# Patient Record
Sex: MALE | Race: BLACK OR AFRICAN AMERICAN | NOT HISPANIC OR LATINO | Employment: FULL TIME | URBAN - METROPOLITAN AREA
[De-identification: names, ages, dates, MRNs, and addresses within clinical notes are randomized per-mention and may not be internally consistent; named-entity substitution may affect disease eponyms.]

---

## 2017-09-06 ENCOUNTER — TRANSCRIBE ORDERS (OUTPATIENT)
Dept: LAB | Facility: HOSPITAL | Age: 57
End: 2017-09-06

## 2017-09-06 ENCOUNTER — LAB (OUTPATIENT)
Dept: LAB | Facility: HOSPITAL | Age: 57
End: 2017-09-06

## 2017-09-06 DIAGNOSIS — N18.30 CHRONIC KIDNEY DISEASE, STAGE III (MODERATE) (HCC): ICD-10-CM

## 2017-09-06 DIAGNOSIS — E55.9 UNSPECIFIED VITAMIN D DEFICIENCY: ICD-10-CM

## 2017-09-06 DIAGNOSIS — E55.9 UNSPECIFIED VITAMIN D DEFICIENCY: Primary | ICD-10-CM

## 2017-09-06 LAB
25(OH)D3 SERPL-MCNC: 25.7 NG/ML
ALBUMIN SERPL-MCNC: 4.2 G/DL (ref 3.2–4.8)
ANION GAP SERPL CALCULATED.3IONS-SCNC: 3 MMOL/L (ref 3–11)
BACTERIA UR QL AUTO: ABNORMAL /HPF
BASOPHILS # BLD AUTO: 0.02 10*3/MM3 (ref 0–0.2)
BASOPHILS NFR BLD AUTO: 0.3 % (ref 0–1)
BILIRUB UR QL STRIP: NEGATIVE
BUN BLD-MCNC: 19 MG/DL (ref 9–23)
BUN/CREAT SERPL: 11.9 (ref 7–25)
CALCIUM SPEC-SCNC: 9.3 MG/DL (ref 8.7–10.4)
CHLORIDE SERPL-SCNC: 108 MMOL/L (ref 99–109)
CLARITY UR: CLEAR
CO2 SERPL-SCNC: 26 MMOL/L (ref 20–31)
COLOR UR: YELLOW
CREAT BLD-MCNC: 1.6 MG/DL (ref 0.6–1.3)
DEPRECATED RDW RBC AUTO: 43.6 FL (ref 37–54)
EOSINOPHIL # BLD AUTO: 0.18 10*3/MM3 (ref 0–0.3)
EOSINOPHIL NFR BLD AUTO: 3.1 % (ref 0–3)
ERYTHROCYTE [DISTWIDTH] IN BLOOD BY AUTOMATED COUNT: 14.2 % (ref 11.3–14.5)
GFR SERPL CREATININE-BSD FRML MDRD: 54 ML/MIN/1.73
GLUCOSE BLD-MCNC: 98 MG/DL (ref 70–100)
GLUCOSE UR STRIP-MCNC: NEGATIVE MG/DL
HCT VFR BLD AUTO: 42.4 % (ref 38.9–50.9)
HGB BLD-MCNC: 13.1 G/DL (ref 13.1–17.5)
HGB UR QL STRIP.AUTO: NEGATIVE
HYALINE CASTS UR QL AUTO: ABNORMAL /LPF
IMM GRANULOCYTES # BLD: 0.01 10*3/MM3 (ref 0–0.03)
IMM GRANULOCYTES NFR BLD: 0.2 % (ref 0–0.6)
KETONES UR QL STRIP: NEGATIVE
LEUKOCYTE ESTERASE UR QL STRIP.AUTO: NEGATIVE
LYMPHOCYTES # BLD AUTO: 2.79 10*3/MM3 (ref 0.6–4.8)
LYMPHOCYTES NFR BLD AUTO: 48 % (ref 24–44)
MCH RBC QN AUTO: 26.4 PG (ref 27–31)
MCHC RBC AUTO-ENTMCNC: 30.9 G/DL (ref 32–36)
MCV RBC AUTO: 85.3 FL (ref 80–99)
MONOCYTES # BLD AUTO: 0.5 10*3/MM3 (ref 0–1)
MONOCYTES NFR BLD AUTO: 8.6 % (ref 0–12)
NEUTROPHILS # BLD AUTO: 2.31 10*3/MM3 (ref 1.5–8.3)
NEUTROPHILS NFR BLD AUTO: 39.8 % (ref 41–71)
NITRITE UR QL STRIP: NEGATIVE
PH UR STRIP.AUTO: 5 [PH] (ref 5–8)
PHOSPHATE SERPL-MCNC: 3.6 MG/DL (ref 2.4–5.1)
PLATELET # BLD AUTO: 194 10*3/MM3 (ref 150–450)
PMV BLD AUTO: 10.8 FL (ref 6–12)
POTASSIUM BLD-SCNC: 4.5 MMOL/L (ref 3.5–5.5)
PROT UR QL STRIP: NEGATIVE
RBC # BLD AUTO: 4.97 10*6/MM3 (ref 4.2–5.76)
RBC # UR: ABNORMAL /HPF
REF LAB TEST METHOD: ABNORMAL
SODIUM BLD-SCNC: 137 MMOL/L (ref 132–146)
SP GR UR STRIP: 1.02 (ref 1–1.03)
SQUAMOUS #/AREA URNS HPF: ABNORMAL /HPF
UROBILINOGEN UR QL STRIP: NORMAL
WBC NRBC COR # BLD: 5.81 10*3/MM3 (ref 3.5–10.8)
WBC UR QL AUTO: ABNORMAL /HPF

## 2017-09-06 PROCEDURE — 36415 COLL VENOUS BLD VENIPUNCTURE: CPT

## 2017-09-06 PROCEDURE — 80069 RENAL FUNCTION PANEL: CPT | Performed by: INTERNAL MEDICINE

## 2017-09-06 PROCEDURE — 81001 URINALYSIS AUTO W/SCOPE: CPT | Performed by: INTERNAL MEDICINE

## 2017-09-06 PROCEDURE — 82306 VITAMIN D 25 HYDROXY: CPT | Performed by: INTERNAL MEDICINE

## 2017-09-06 PROCEDURE — 85025 COMPLETE CBC W/AUTO DIFF WBC: CPT | Performed by: INTERNAL MEDICINE

## 2018-05-07 DIAGNOSIS — Z12.11 SCREENING FOR COLON CANCER: Primary | ICD-10-CM

## 2018-05-14 ENCOUNTER — OUTSIDE FACILITY SERVICE (OUTPATIENT)
Dept: GASTROENTEROLOGY | Facility: CLINIC | Age: 58
End: 2018-05-14

## 2018-05-14 PROCEDURE — G0105 COLORECTAL SCRN; HI RISK IND: HCPCS | Performed by: INTERNAL MEDICINE

## 2020-08-13 ENCOUNTER — APPOINTMENT (OUTPATIENT)
Dept: GENERAL RADIOLOGY | Facility: HOSPITAL | Age: 60
End: 2020-08-13

## 2020-08-13 ENCOUNTER — APPOINTMENT (OUTPATIENT)
Dept: CT IMAGING | Facility: HOSPITAL | Age: 60
End: 2020-08-13

## 2020-08-13 ENCOUNTER — HOSPITAL ENCOUNTER (INPATIENT)
Facility: HOSPITAL | Age: 60
LOS: 3 days | Discharge: HOME | End: 2020-08-16
Attending: EMERGENCY MEDICINE | Admitting: INTERNAL MEDICINE

## 2020-08-13 DIAGNOSIS — A41.9 ACUTE SEPSIS (HCC): Primary | ICD-10-CM

## 2020-08-13 DIAGNOSIS — N41.0 ACUTE PROSTATITIS: ICD-10-CM

## 2020-08-13 DIAGNOSIS — N17.9 ACUTE KIDNEY INJURY (HCC): ICD-10-CM

## 2020-08-13 DIAGNOSIS — E87.20 LACTIC ACIDOSIS: ICD-10-CM

## 2020-08-13 PROBLEM — R74.8 ELEVATED ALKALINE PHOSPHATASE LEVEL: Status: ACTIVE | Noted: 2020-08-13

## 2020-08-13 PROBLEM — E87.6 HYPOKALEMIA: Status: ACTIVE | Noted: 2020-08-13

## 2020-08-13 PROBLEM — I95.9 HYPOTENSION: Status: ACTIVE | Noted: 2020-08-13

## 2020-08-13 PROBLEM — R73.9 HYPERGLYCEMIA: Status: ACTIVE | Noted: 2020-08-13

## 2020-08-13 PROBLEM — N39.0 ACUTE UTI (URINARY TRACT INFECTION): Status: ACTIVE | Noted: 2020-08-13

## 2020-08-13 LAB
ALBUMIN SERPL-MCNC: 4.1 G/DL (ref 3.5–5.2)
ALBUMIN/GLOB SERPL: 1.2 G/DL
ALP SERPL-CCNC: 130 U/L (ref 39–117)
ALT SERPL W P-5'-P-CCNC: 21 U/L (ref 1–41)
ANION GAP SERPL CALCULATED.3IONS-SCNC: 15 MMOL/L (ref 5–15)
AST SERPL-CCNC: 27 U/L (ref 1–40)
BACTERIA UR QL AUTO: ABNORMAL /HPF
BASOPHILS # BLD MANUAL: 0 10*3/MM3 (ref 0–0.2)
BASOPHILS NFR BLD AUTO: 0 % (ref 0–1.5)
BILIRUB SERPL-MCNC: 0.8 MG/DL (ref 0–1.2)
BILIRUB UR QL STRIP: NEGATIVE
BUN SERPL-MCNC: 26 MG/DL (ref 8–23)
BUN/CREAT SERPL: 11.4 (ref 7–25)
CALCIUM SPEC-SCNC: 9.4 MG/DL (ref 8.6–10.5)
CHLORIDE SERPL-SCNC: 102 MMOL/L (ref 98–107)
CLARITY UR: ABNORMAL
CLUMPED PLATELETS: PRESENT
CO2 SERPL-SCNC: 18 MMOL/L (ref 22–29)
COLOR UR: YELLOW
CREAT SERPL-MCNC: 2.29 MG/DL (ref 0.76–1.27)
D-LACTATE SERPL-SCNC: 2.8 MMOL/L (ref 0.5–2)
DEPRECATED RDW RBC AUTO: 41.5 FL (ref 37–54)
DOHLE BODIES: PRESENT
EOSINOPHIL # BLD MANUAL: 0.16 10*3/MM3 (ref 0–0.4)
EOSINOPHIL NFR BLD MANUAL: 2 % (ref 0.3–6.2)
ERYTHROCYTE [DISTWIDTH] IN BLOOD BY AUTOMATED COUNT: 13.7 % (ref 12.3–15.4)
GFR SERPL CREATININE-BSD FRML MDRD: 35 ML/MIN/1.73
GLOBULIN UR ELPH-MCNC: 3.4 GM/DL
GLUCOSE SERPL-MCNC: 168 MG/DL (ref 65–99)
GLUCOSE UR STRIP-MCNC: NEGATIVE MG/DL
HCT VFR BLD AUTO: 42.7 % (ref 37.5–51)
HGB BLD-MCNC: 13.4 G/DL (ref 13–17.7)
HGB UR QL STRIP.AUTO: ABNORMAL
HOLD SPECIMEN: NORMAL
HOLD SPECIMEN: NORMAL
HYALINE CASTS UR QL AUTO: ABNORMAL /LPF
KETONES UR QL STRIP: ABNORMAL
LACTATE HOLD SPECIMEN: NORMAL
LARGE PLATELETS: ABNORMAL
LEUKOCYTE ESTERASE UR QL STRIP.AUTO: ABNORMAL
LYMPHOCYTES # BLD MANUAL: 1.01 10*3/MM3 (ref 0.7–3.1)
LYMPHOCYTES NFR BLD MANUAL: 13 % (ref 19.6–45.3)
LYMPHOCYTES NFR BLD MANUAL: 3 % (ref 5–12)
MCH RBC QN AUTO: 26.1 PG (ref 26.6–33)
MCHC RBC AUTO-ENTMCNC: 31.4 G/DL (ref 31.5–35.7)
MCV RBC AUTO: 83.2 FL (ref 79–97)
MONOCYTES # BLD AUTO: 0.23 10*3/MM3 (ref 0.1–0.9)
NEUTROPHILS # BLD AUTO: 6.39 10*3/MM3 (ref 1.7–7)
NEUTROPHILS NFR BLD MANUAL: 69 % (ref 42.7–76)
NEUTS BAND NFR BLD MANUAL: 13 % (ref 0–5)
NITRITE UR QL STRIP: NEGATIVE
PH UR STRIP.AUTO: <=5 [PH] (ref 5–8)
PLATELET # BLD AUTO: 166 10*3/MM3 (ref 140–450)
PMV BLD AUTO: 10.5 FL (ref 6–12)
POTASSIUM SERPL-SCNC: 3.4 MMOL/L (ref 3.5–5.2)
PROT SERPL-MCNC: 7.5 G/DL (ref 6–8.5)
PROT UR QL STRIP: ABNORMAL
RBC # BLD AUTO: 5.13 10*6/MM3 (ref 4.14–5.8)
RBC # UR: ABNORMAL /HPF
RBC MORPH BLD: NORMAL
REF LAB TEST METHOD: ABNORMAL
SMALL PLATELETS BLD QL SMEAR: ADEQUATE
SODIUM SERPL-SCNC: 135 MMOL/L (ref 136–145)
SP GR UR STRIP: 1.07 (ref 1–1.03)
SQUAMOUS #/AREA URNS HPF: ABNORMAL /HPF
TOXIC GRANULATION: ABNORMAL
UROBILINOGEN UR QL STRIP: ABNORMAL
WBC # BLD AUTO: 7.79 10*3/MM3 (ref 3.4–10.8)
WBC UR QL AUTO: ABNORMAL /HPF
WHOLE BLOOD HOLD SPECIMEN: NORMAL
WHOLE BLOOD HOLD SPECIMEN: NORMAL

## 2020-08-13 PROCEDURE — 87186 SC STD MICRODIL/AGAR DIL: CPT | Performed by: EMERGENCY MEDICINE

## 2020-08-13 PROCEDURE — 99223 1ST HOSP IP/OBS HIGH 75: CPT | Performed by: INTERNAL MEDICINE

## 2020-08-13 PROCEDURE — 99284 EMERGENCY DEPT VISIT MOD MDM: CPT

## 2020-08-13 PROCEDURE — 82570 ASSAY OF URINE CREATININE: CPT | Performed by: NURSE PRACTITIONER

## 2020-08-13 PROCEDURE — 84156 ASSAY OF PROTEIN URINE: CPT | Performed by: NURSE PRACTITIONER

## 2020-08-13 PROCEDURE — 87086 URINE CULTURE/COLONY COUNT: CPT | Performed by: NURSE PRACTITIONER

## 2020-08-13 PROCEDURE — 81001 URINALYSIS AUTO W/SCOPE: CPT | Performed by: EMERGENCY MEDICINE

## 2020-08-13 PROCEDURE — 74177 CT ABD & PELVIS W/CONTRAST: CPT

## 2020-08-13 PROCEDURE — 83605 ASSAY OF LACTIC ACID: CPT | Performed by: EMERGENCY MEDICINE

## 2020-08-13 PROCEDURE — 25010000002 PIPERACILLIN SOD-TAZOBACTAM PER 1 G: Performed by: EMERGENCY MEDICINE

## 2020-08-13 PROCEDURE — 93005 ELECTROCARDIOGRAM TRACING: CPT | Performed by: EMERGENCY MEDICINE

## 2020-08-13 PROCEDURE — 87088 URINE BACTERIA CULTURE: CPT | Performed by: NURSE PRACTITIONER

## 2020-08-13 PROCEDURE — 85025 COMPLETE CBC W/AUTO DIFF WBC: CPT | Performed by: EMERGENCY MEDICINE

## 2020-08-13 PROCEDURE — 25010000002 IOPAMIDOL 61 % SOLUTION: Performed by: EMERGENCY MEDICINE

## 2020-08-13 PROCEDURE — 83935 ASSAY OF URINE OSMOLALITY: CPT | Performed by: NURSE PRACTITIONER

## 2020-08-13 PROCEDURE — 71045 X-RAY EXAM CHEST 1 VIEW: CPT

## 2020-08-13 PROCEDURE — 87205 SMEAR GRAM STAIN: CPT | Performed by: NURSE PRACTITIONER

## 2020-08-13 PROCEDURE — 87150 DNA/RNA AMPLIFIED PROBE: CPT | Performed by: EMERGENCY MEDICINE

## 2020-08-13 PROCEDURE — 84300 ASSAY OF URINE SODIUM: CPT | Performed by: NURSE PRACTITIONER

## 2020-08-13 PROCEDURE — 80053 COMPREHEN METABOLIC PANEL: CPT | Performed by: EMERGENCY MEDICINE

## 2020-08-13 PROCEDURE — 85007 BL SMEAR W/DIFF WBC COUNT: CPT | Performed by: EMERGENCY MEDICINE

## 2020-08-13 PROCEDURE — 87040 BLOOD CULTURE FOR BACTERIA: CPT | Performed by: EMERGENCY MEDICINE

## 2020-08-13 PROCEDURE — 87186 SC STD MICRODIL/AGAR DIL: CPT | Performed by: NURSE PRACTITIONER

## 2020-08-13 RX ORDER — SODIUM CHLORIDE 0.9 % (FLUSH) 0.9 %
10 SYRINGE (ML) INJECTION AS NEEDED
Status: DISCONTINUED | OUTPATIENT
Start: 2020-08-13 | End: 2020-08-16 | Stop reason: HOSPADM

## 2020-08-13 RX ADMIN — SODIUM CHLORIDE, POTASSIUM CHLORIDE, SODIUM LACTATE AND CALCIUM CHLORIDE 1000 ML: 600; 310; 30; 20 INJECTION, SOLUTION INTRAVENOUS at 22:20

## 2020-08-13 RX ADMIN — SODIUM CHLORIDE, POTASSIUM CHLORIDE, SODIUM LACTATE AND CALCIUM CHLORIDE 500 ML: 600; 310; 30; 20 INJECTION, SOLUTION INTRAVENOUS at 20:32

## 2020-08-13 RX ADMIN — SODIUM CHLORIDE, POTASSIUM CHLORIDE, SODIUM LACTATE AND CALCIUM CHLORIDE 1000 ML: 600; 310; 30; 20 INJECTION, SOLUTION INTRAVENOUS at 23:43

## 2020-08-13 RX ADMIN — TAZOBACTAM SODIUM AND PIPERACILLIN SODIUM 4.5 G: 500; 4 INJECTION, SOLUTION INTRAVENOUS at 22:20

## 2020-08-13 RX ADMIN — IOPAMIDOL 95 ML: 612 INJECTION, SOLUTION INTRAVENOUS at 20:14

## 2020-08-14 PROBLEM — N18.30 ACUTE RENAL FAILURE SUPERIMPOSED ON STAGE 3 CHRONIC KIDNEY DISEASE (HCC): Status: ACTIVE | Noted: 2020-08-13

## 2020-08-14 PROBLEM — N41.9 PROSTATITIS: Status: ACTIVE | Noted: 2020-08-14

## 2020-08-14 PROBLEM — R78.81 GRAM-NEGATIVE BACTEREMIA: Status: ACTIVE | Noted: 2020-08-14

## 2020-08-14 LAB
ANION GAP SERPL CALCULATED.3IONS-SCNC: 10 MMOL/L (ref 5–15)
BACTERIA BLD CULT: ABNORMAL
BASOPHILS # BLD MANUAL: 0 10*3/MM3 (ref 0–0.2)
BASOPHILS NFR BLD AUTO: 0 % (ref 0–1.5)
BUN SERPL-MCNC: 26 MG/DL (ref 8–23)
BUN/CREAT SERPL: 11.6 (ref 7–25)
CALCIUM SPEC-SCNC: 8.7 MG/DL (ref 8.6–10.5)
CHLORIDE SERPL-SCNC: 104 MMOL/L (ref 98–107)
CO2 SERPL-SCNC: 22 MMOL/L (ref 22–29)
CREAT SERPL-MCNC: 2.25 MG/DL (ref 0.76–1.27)
CREAT UR-MCNC: 265 MG/DL
D-LACTATE SERPL-SCNC: 2.3 MMOL/L (ref 0.5–2)
DEPRECATED RDW RBC AUTO: 43.1 FL (ref 37–54)
EOSINOPHIL # BLD MANUAL: 0 10*3/MM3 (ref 0–0.4)
EOSINOPHIL NFR BLD MANUAL: 0 % (ref 0.3–6.2)
EOSINOPHIL SPEC QL MICRO: 0 % EOS/100 CELLS (ref 0–0)
ERYTHROCYTE [DISTWIDTH] IN BLOOD BY AUTOMATED COUNT: 13.8 % (ref 12.3–15.4)
GFR SERPL CREATININE-BSD FRML MDRD: 36 ML/MIN/1.73
GLUCOSE SERPL-MCNC: 137 MG/DL (ref 65–99)
HBA1C MFR BLD: 6.1 % (ref 4.8–5.6)
HCT VFR BLD AUTO: 37 % (ref 37.5–51)
HGB BLD-MCNC: 11.3 G/DL (ref 13–17.7)
LYMPHOCYTES # BLD MANUAL: 1.27 10*3/MM3 (ref 0.7–3.1)
LYMPHOCYTES NFR BLD MANUAL: 10 % (ref 19.6–45.3)
LYMPHOCYTES NFR BLD MANUAL: 10 % (ref 5–12)
MCH RBC QN AUTO: 26 PG (ref 26.6–33)
MCHC RBC AUTO-ENTMCNC: 30.5 G/DL (ref 31.5–35.7)
MCV RBC AUTO: 85.1 FL (ref 79–97)
MONOCYTES # BLD AUTO: 1.27 10*3/MM3 (ref 0.1–0.9)
NEUTROPHILS # BLD AUTO: 10.18 10*3/MM3 (ref 1.7–7)
NEUTROPHILS NFR BLD MANUAL: 69 % (ref 42.7–76)
NEUTS BAND NFR BLD MANUAL: 11 % (ref 0–5)
OSMOLALITY UR: 559 MOSM/KG (ref 300–1100)
PLAT MORPH BLD: NORMAL
PLATELET # BLD AUTO: 148 10*3/MM3 (ref 140–450)
PMV BLD AUTO: 10.1 FL (ref 6–12)
POTASSIUM SERPL-SCNC: 4 MMOL/L (ref 3.5–5.2)
PROT UR-MCNC: 49.7 MG/DL
PSA SERPL-MCNC: 20.5 NG/ML (ref 0–4)
RBC # BLD AUTO: 4.35 10*6/MM3 (ref 4.14–5.8)
RBC MORPH BLD: NORMAL
SODIUM SERPL-SCNC: 136 MMOL/L (ref 136–145)
SODIUM UR-SCNC: 20 MMOL/L
WBC # BLD AUTO: 12.73 10*3/MM3 (ref 3.4–10.8)
WBC MORPH BLD: NORMAL

## 2020-08-14 PROCEDURE — 85007 BL SMEAR W/DIFF WBC COUNT: CPT | Performed by: NURSE PRACTITIONER

## 2020-08-14 PROCEDURE — 99233 SBSQ HOSP IP/OBS HIGH 50: CPT | Performed by: INTERNAL MEDICINE

## 2020-08-14 PROCEDURE — 84153 ASSAY OF PSA TOTAL: CPT | Performed by: INTERNAL MEDICINE

## 2020-08-14 PROCEDURE — 83605 ASSAY OF LACTIC ACID: CPT | Performed by: EMERGENCY MEDICINE

## 2020-08-14 PROCEDURE — 25010000002 CEFTRIAXONE PER 250 MG: Performed by: INTERNAL MEDICINE

## 2020-08-14 PROCEDURE — 87491 CHLMYD TRACH DNA AMP PROBE: CPT | Performed by: INTERNAL MEDICINE

## 2020-08-14 PROCEDURE — 25010000002 HEPARIN (PORCINE) PER 1000 UNITS: Performed by: NURSE PRACTITIONER

## 2020-08-14 PROCEDURE — 80048 BASIC METABOLIC PNL TOTAL CA: CPT | Performed by: NURSE PRACTITIONER

## 2020-08-14 PROCEDURE — 87591 N.GONORRHOEAE DNA AMP PROB: CPT | Performed by: INTERNAL MEDICINE

## 2020-08-14 PROCEDURE — 25010000002 PIPERACILLIN SOD-TAZOBACTAM PER 1 G: Performed by: NURSE PRACTITIONER

## 2020-08-14 PROCEDURE — 83036 HEMOGLOBIN GLYCOSYLATED A1C: CPT | Performed by: NURSE PRACTITIONER

## 2020-08-14 PROCEDURE — 85025 COMPLETE CBC W/AUTO DIFF WBC: CPT | Performed by: NURSE PRACTITIONER

## 2020-08-14 RX ORDER — ACETAMINOPHEN 325 MG/1
650 TABLET ORAL EVERY 4 HOURS PRN
Status: DISCONTINUED | OUTPATIENT
Start: 2020-08-14 | End: 2020-08-16 | Stop reason: HOSPADM

## 2020-08-14 RX ORDER — SODIUM CHLORIDE 9 MG/ML
100 INJECTION, SOLUTION INTRAVENOUS CONTINUOUS
Status: DISCONTINUED | OUTPATIENT
Start: 2020-08-14 | End: 2020-08-15

## 2020-08-14 RX ORDER — SODIUM CHLORIDE 0.9 % (FLUSH) 0.9 %
10 SYRINGE (ML) INJECTION EVERY 12 HOURS SCHEDULED
Status: DISCONTINUED | OUTPATIENT
Start: 2020-08-14 | End: 2020-08-16 | Stop reason: HOSPADM

## 2020-08-14 RX ORDER — SODIUM CHLORIDE 0.9 % (FLUSH) 0.9 %
10 SYRINGE (ML) INJECTION AS NEEDED
Status: DISCONTINUED | OUTPATIENT
Start: 2020-08-14 | End: 2020-08-16 | Stop reason: HOSPADM

## 2020-08-14 RX ORDER — HEPARIN SODIUM 5000 [USP'U]/ML
5000 INJECTION, SOLUTION INTRAVENOUS; SUBCUTANEOUS EVERY 8 HOURS SCHEDULED
Status: DISCONTINUED | OUTPATIENT
Start: 2020-08-14 | End: 2020-08-16 | Stop reason: HOSPADM

## 2020-08-14 RX ADMIN — HEPARIN SODIUM 5000 UNITS: 5000 INJECTION INTRAVENOUS; SUBCUTANEOUS at 21:46

## 2020-08-14 RX ADMIN — SODIUM CHLORIDE 125 ML/HR: 9 INJECTION, SOLUTION INTRAVENOUS at 02:06

## 2020-08-14 RX ADMIN — CEFTRIAXONE SODIUM 1 G: 1 INJECTION, POWDER, FOR SOLUTION INTRAMUSCULAR; INTRAVENOUS at 09:04

## 2020-08-14 RX ADMIN — TAZOBACTAM SODIUM AND PIPERACILLIN SODIUM 3.38 G: 375; 3 INJECTION, SOLUTION INTRAVENOUS at 03:37

## 2020-08-14 RX ADMIN — SODIUM CHLORIDE 100 ML/HR: 9 INJECTION, SOLUTION INTRAVENOUS at 21:46

## 2020-08-14 RX ADMIN — ACETAMINOPHEN 650 MG: 325 TABLET, FILM COATED ORAL at 16:31

## 2020-08-14 RX ADMIN — ACETAMINOPHEN 650 MG: 325 TABLET, FILM COATED ORAL at 23:46

## 2020-08-14 RX ADMIN — SODIUM CHLORIDE, PRESERVATIVE FREE 10 ML: 5 INJECTION INTRAVENOUS at 21:50

## 2020-08-14 RX ADMIN — HEPARIN SODIUM 5000 UNITS: 5000 INJECTION INTRAVENOUS; SUBCUTANEOUS at 13:35

## 2020-08-14 RX ADMIN — HEPARIN SODIUM 5000 UNITS: 5000 INJECTION INTRAVENOUS; SUBCUTANEOUS at 06:33

## 2020-08-14 RX ADMIN — SODIUM CHLORIDE, PRESERVATIVE FREE 10 ML: 5 INJECTION INTRAVENOUS at 01:57

## 2020-08-14 NOTE — ED PROVIDER NOTES
EMERGENCY DEPARTMENT ENCOUNTER      Pt Name: Danilo Heath  MRN: [id number]  YOB: 1960  Date of evaluation: 8/13/2020  Provider: Gregorio Quezada MD    CHIEF COMPLAINT       Chief Complaint   Patient presents with   • Dizziness   • Dysuria         HISTORY OF PRESENT ILLNESS  (Location/Symptom, Timing/Onset, Context/Setting, Quality, Duration, Modifying Factors, Severity.)   Danilo Heath is a 59 y.o. male who presents to the emergency department with dysuria, frequency, urgency, body aches, and chills for the past several days.  Patient states that he has a history of prostatitis multiple times in the past. He denies any respiratory symptoms including cough, SOB, CP, or other upper respiratory symptoms. No overt abdominal pain, vomiting, or diarrhea. Describes pain as burning and moderate in severity. Has been taking tylenol at home with some relief.      Nursing notes were reviewed.    REVIEW OF SYSTEMS    (2-9 systems for level 4, 10 or more for level 5)   ROS:  General:  No fevers, no chills, no weakness  Cardiovascular:  No chest pain, no palpitations  Respiratory:  No shortness of breath, no cough, no wheezing  Gastrointestinal:  No pain, no nausea, no vomiting, no diarrhea  Musculoskeletal:  No muscle pain, no joint pain  Skin:  No rash, no easy bruising  Neurologic:  No speech problems, no headache, no extremity numbness, no extremity tingling, no extremity weakness  Psychiatric:  No anxiety  Genitourinary:  + dysuria    Except as noted above the remainder of the review of systems was reviewed and negative.       PAST MEDICAL HISTORY     Past Medical History:   Diagnosis Date   • Bowel obstruction (CMS/HCC)    • Prostatitis          SURGICAL HISTORY       Past Surgical History:   Procedure Laterality Date   • ABDOMINAL SURGERY      intestinal obstruction    • APPENDECTOMY           CURRENT MEDICATIONS       Current Facility-Administered Medications:   •  sodium chloride 0.9 % flush 10 mL, 10  mL, Intravenous, PRN, Gregorio Quezada MD    Current Outpatient Medications:   •  Sod Picosulfate-Mag Ox-Cit Acd 10-3.5-12 MG-GM -GM/160ML solution, Take 1 kit by mouth Take As Directed. Follow instructions mailed to your home. If you did not receive instructions call office [phone]., Disp: 2 bottle, Rfl: 0    ALLERGIES     Patient has no known allergies.    FAMILY HISTORY       Family History   Problem Relation Age of Onset   • Cancer Mother    • Heart disease Father    • Stroke Father    • Diabetes Father           SOCIAL HISTORY       Social History     Socioeconomic History   • Marital status:      Spouse name: Not on file   • Number of children: Not on file   • Years of education: Not on file   • Highest education level: Not on file   Tobacco Use   • Smoking status: Never Smoker   Substance and Sexual Activity   • Alcohol use: Yes     Comment: SOCIALLY- WEEKLY    • Drug use: Never         PHYSICAL EXAM    (up to 7 for level 4, 8 or more for level 5)     Vitals:    08/13/20 2230 08/13/20 2245 08/13/20 2300 08/14/20 0000   BP: 91/65  93/58 99/69   BP Location:       Patient Position:       Pulse: 79 80 76    Resp:       Temp:       TempSrc:       SpO2: 95% 94% 94%    Weight:       Height:           Physical Exam  General: Awake, alert, no acute distress.  HEENT: Conjunctiva normal.  Neck: Trachea midline.  Cardiac: tachycardic, regular rhythm, no murmurs, rubs, or gallops  Lungs: Lungs are clear to auscultation, there is no wheezing, rhonchi, or rales. There is no use of accessory muscles.  Chest wall: There is no tenderness to palpation over the chest wall or over ribs  Abdomen: Abdomen is soft, nontender, nondistended. There is no firm or pulsatile masses, no rebound rigidity or guarding.   Musculoskeletal: No deformity.  Neuro: Alert and oriented x 4.  Dermatology: Skin is warm and dry  Psych: Mentation is grossly normal, cognition is grossly normal. Affect is appropriate.      DIAGNOSTIC RESULTS      EKG: All EKG's are interpreted by the Emergency Department Physician who either signs or Co-signs this chart in the absence of a cardiologist.    ST rate 105, no acute ST/T change, normal intervals, no ectopy    RADIOLOGY:   Non-plain film images such as CT, Ultrasound and MRI are read by the radiologist. Plain radiographic images are visualized and preliminarily interpreted by the emergency physician with the below findings:      [x] Radiologist's Report Reviewed:  CT Abdomen Pelvis With Contrast   Final Result   Prostate gland appears be slightly lower in density than usual. It is slightly prominent measuring 5.1 cm maximum dimension.      No abscess is visible.      Otherwise study is normal               Signer Name: Roman Franco MD    Signed: 8/13/2020 8:31 PM    Workstation Name: [facility]     Radiology [facility]      XR Chest 1 View   Final Result   No acute cardiopulmonary findings.      Signer Name: WHITNEY Quezada MD    Signed: 8/13/2020 8:12 PM    Workstation Name: [facility]            LABS:    I have reviewed and interpreted all of the currently available lab results from this visit (if applicable):  Results for orders placed or performed during the hospital encounter of 08/13/20   Comprehensive Metabolic Panel   Result Value Ref Range    Glucose 168 (H) 65 - 99 mg/dL    BUN 26 (H) 8 - 23 mg/dL    Creatinine 2.29 (H) 0.76 - 1.27 mg/dL    Sodium 135 (L) 136 - 145 mmol/L    Potassium 3.4 (L) 3.5 - 5.2 mmol/L    Chloride 102 98 - 107 mmol/L    CO2 18.0 (L) 22.0 - 29.0 mmol/L    Calcium 9.4 8.6 - 10.5 mg/dL    Total Protein 7.5 6.0 - 8.5 g/dL    Albumin 4.10 3.50 - 5.20 g/dL    ALT (SGPT) 21 1 - 41 U/L    AST (SGOT) 27 1 - 40 U/L    Alkaline Phosphatase 130 (H) 39 - 117 U/L    Total Bilirubin 0.8 0.0 - 1.2 mg/dL    eGFR  African Amer 35 (L) >60 mL/min/1.73    Globulin 3.4 gm/dL    A/G Ratio 1.2 g/dL    BUN/Creatinine Ratio 11.4 7.0 - 25.0     Anion Gap 15.0 5.0 - 15.0 mmol/L   Lactic Acid, Plasma   Result Value Ref Range    Lactate 2.8 (C) 0.5 - 2.0 mmol/L   CBC Auto Differential   Result Value Ref Range    WBC 7.79 3.40 - 10.80 10*3/mm3    RBC 5.13 4.14 - 5.80 10*6/mm3    Hemoglobin 13.4 13.0 - 17.7 g/dL    Hematocrit 42.7 37.5 - 51.0 %    MCV 83.2 79.0 - 97.0 fL    MCH 26.1 (L) 26.6 - 33.0 pg    MCHC 31.4 (L) 31.5 - 35.7 g/dL    RDW 13.7 12.3 - 15.4 %    RDW-SD 41.5 37.0 - 54.0 fl    MPV 10.5 6.0 - 12.0 fL    Platelets 166 140 - 450 10*3/mm3   Lactic Acid, Reflex Timer (This will reflex a repeat order 3-3:15 hours after ordered.)   Result Value Ref Range    Hold Tube Hold for add-ons.    Manual Differential   Result Value Ref Range    Neutrophil % 69.0 42.7 - 76.0 %    Lymphocyte % 13.0 (L) 19.6 - 45.3 %    Monocyte % 3.0 (L) 5.0 - 12.0 %    Eosinophil % 2.0 0.3 - 6.2 %    Basophil % 0.0 0.0 - 1.5 %    Bands %  13.0 (H) 0.0 - 5.0 %    Neutrophils Absolute 6.39 1.70 - 7.00 10*3/mm3    Lymphocytes Absolute 1.01 0.70 - 3.10 10*3/mm3    Monocytes Absolute 0.23 0.10 - 0.90 10*3/mm3    Eosinophils Absolute 0.16 0.00 - 0.40 10*3/mm3    Basophils Absolute 0.00 0.00 - 0.20 10*3/mm3    RBC Morphology Normal Normal    Dohle Bodies Present None Seen    Toxic Granulation Slight/1+ None Seen    Platelet Estimate Adequate Normal    Clumped Platelets Present None Seen    Large Platelets Slight/1+ None Seen   Urinalysis With Microscopic If Indicated (No Culture) - Urine, Clean Catch   Result Value Ref Range    Color, UA Yellow Yellow, Straw    Appearance, UA Cloudy (A) Clear    pH, UA <=5.0 5.0 - 8.0    Specific Gravity, UA 1.073 (H) 1.001 - 1.030    Glucose, UA Negative Negative    Ketones, UA Trace (A) Negative    Bilirubin, UA Negative Negative    Blood, UA Trace (A) Negative    Protein, UA 30 mg/dL (1+) (A) Negative    Leuk Esterase, UA Moderate (2+) (A) Negative    Nitrite, UA Negative Negative    Urobilinogen, UA 1.0 E.U./dL 0.2 - 1.0 E.U./dL   Urinalysis,  Microscopic Only - Urine, Clean Catch   Result Value Ref Range    RBC, UA 13-20 (A) None Seen, 0-2 /HPF    WBC, UA Too Numerous to Count (A) None Seen, 0-2 /HPF    Bacteria, UA 3+ (A) None Seen, Trace /HPF    Squamous Epithelial Cells, UA None Seen None Seen, 0-2 /HPF    Hyaline Casts, UA 0-6 0 - 6 /LPF    Methodology Automated Microscopy    Light Blue Top   Result Value Ref Range    Extra Tube hold for add-on    Green Top (Gel)   Result Value Ref Range    Extra Tube Hold for add-ons.    Lavender Top   Result Value Ref Range    Extra Tube hold for add-on    Gold Top - SST   Result Value Ref Range    Extra Tube Hold for add-ons.         All other labs were within normal range or not returned as of this dictation.      EMERGENCY DEPARTMENT COURSE and DIFFERENTIAL DIAGNOSIS/MDM:   Vitals:    Vitals:    08/13/20 2230 08/13/20 2245 08/13/20 2300 08/14/20 0000   BP: 91/65  93/58 99/69   BP Location:       Patient Position:       Pulse: 79 80 76    Resp:       Temp:       TempSrc:       SpO2: 95% 94% 94%    Weight:       Height:           ED Course as of Aug 14 0019   Thu Aug 13, 2020   2010 Chest x-ray reviewed and no evidence of emergent process    [NS]   2137 Awaiting cath urine    [NS]      ED Course User Index  [NS] Gregorio Quezada MD       Patient w/ acute sepsis 2/2 pyelo vs likely prostatitis. CT neg for abscess or other acute/surgical abdominal/pelvic process. No respiratory sx to suggest covid or PNA and CXR is negative. Pt with borderline HOTN during ED stay and req IV fluids. Also given IV zosyn for sepsis.       MEDICATIONS ADMINISTERED IN ED:  Medications   sodium chloride 0.9 % flush 10 mL (has no administration in time range)   lactated ringers bolus 500 mL (0 mL Intravenous Stopped 8/13/20 2138)   iopamidol (ISOVUE-300) 61 % injection 100 mL (95 mL Intravenous Given 8/13/20 2014)   lactated ringers bolus 1,000 mL (0 mL Intravenous Stopped 8/13/20 2343)   piperacillin-tazobactam (ZOSYN) 4.5 g in  iso-osmotic dextrose 100 mL IVPB (premix) (0 g Intravenous Stopped 8/13/20 4073)   lactated ringers bolus 1,000 mL (1,000 mL Intravenous New Bag 8/13/20 8814)         CRITICAL CARE TIME    Approximately 35 minutes of discontinuous critical care time was provided to this patient by myself absent of any time spent performing procedures.  Patient presents critically ill with sepsis 2/2 pyelonephritis vs prostatitis with hypotension/lactic acidosis and potentiential multi-system compromise requiring the following interventions: IV fluids, broad spectrum abx, frequent reassessment, interpretation of labs/imaging, coordination of admission with the following response: hemodynamic stabilization.  Patient at high risk of deterioration and possibly death without these interventions.        FINAL IMPRESSION      1. Acute sepsis (CMS/HCC)    2. Acute prostatitis    3. Acute kidney injury (CMS/HCC)    4. Lactic acidosis          DISPOSITION/PLAN     ED Disposition     ED Disposition Condition Comment    Decision to Admit  Level of Care: Telemetry [5]   Diagnosis: Prostatitis [092781]   Admitting Physician: KVNG BEAUCHAMP [[id number]]   Attending Physician: KVNG BEAUCHAMP [[id number]]   Certification: I Certify That Inpatient Hospital Services Are Medically Necessary For Greater Than 2 Midnights                Comment: Please note this report has been produced using speech recognition software.      Gregorio Quezada MD  Attending Emergency Physician               Gregorio Quezada MD  08/14/20 0019

## 2020-08-14 NOTE — PROGRESS NOTES
Continued Stay Note  [facility]     Patient Name: Danilo Heath  MRN: [id number]  Today's Date: 8/14/2020    Admit Date: 8/13/2020    Discharge Plan     Row Name 08/14/20 1426       Plan    Plan Comments  CM received call from Suzette  at [facility] office. Pt will need outpatient antibiotic infusions at time of discharge.  If pt is discharged on Saturday 08/15 he has  an appointment arranged for antibiotic infusion at [facility] office on Sunday 08/16/2020 at 0830am. Pt also has a follow up appointment arranged with Dr. Hobson and will also receive antibiotic infusions on Monday 08/17/2020 @ 0845am.  CM updated primary MARILEE Blake of discharge plans. CM will continue to follow.         Discharge Codes    No documentation.             María Guthrie RN

## 2020-08-14 NOTE — PLAN OF CARE
Problem: Patient Care Overview  Goal: Plan of Care Review  Outcome: Ongoing (interventions implemented as appropriate)  Flowsheets  Taken 8/14/2020 0551  Progress: no change  Outcome Summary: Patient arrived on the unit with signs and symptoms related to sepsis and an acute kidney injury. Patient has had a decent shift, sleeping on and off since arriving on the unit. VSS, and patient has been alert and oriented times four. No complaints of pain tonight. Nursing staff will continue to monitor and assess the patient.  Taken 8/14/2020 0200  Plan of Care Reviewed With: patient  Goal: Individualization and Mutuality  Outcome: Ongoing (interventions implemented as appropriate)  Flowsheets (Taken 8/14/2020 0551)  Patient Specific Goals (Include Timeframe): Monitor and assess the patient q2hrs and prn     Problem: Sepsis/Septic Shock (Adult)  Goal: Signs and Symptoms of Listed Potential Problems Will be Absent, Minimized or Managed (Sepsis/Septic Shock)  Outcome: Ongoing (interventions implemented as appropriate)  Flowsheets (Taken 8/14/2020 0551)  Problems Assessed (Sepsis): all  Problems Present (Sepsis): none

## 2020-08-14 NOTE — PLAN OF CARE
Problem: Patient Care Overview  Goal: Plan of Care Review  Flowsheets  Taken 8/14/2020 1526  Progress: improving  Outcome Summary: VSS, room air. Pt reports feeling improved but states he still feels like he has some chills. Spiked a fever of 103 this afternoon, managed with tylenol. Now down to 98.9. Will continue to monitor.  Taken 8/14/2020 0800  Plan of Care Reviewed With: patient

## 2020-08-14 NOTE — H&P
[facility] Medicine Services  HISTORY AND PHYSICAL    Patient Name: Danilo Heath  : 1960  MRN: [id number]  Primary Care Physician: ROSS Cole MD  Date of admission: 2020      Subjective   Subjective     Chief Complaint:  Dysuria, chills, frequency, dizziness     HPI:  Danilo Heath is a 59 y.o. male with PMH significant for bowel obstruction and prostatitis who presents to the ED with complaint of chills, dysuria, myalgias, and urinary frequency.   He states that 3 days ago he developed chills, and myalgias with dysuria, and urinary frequency. He reports that he has also been having dizziness and generally feels unwell. He denies cough, chest pain, SOB, loss of sense of taste or smell or known COVID exposure. He reports having a COVID-19 test 3 days ago that he reports was negative.   Upon arrival to the ED, he is hypotensive and mildly tachycardic. He is found to have KAMINI and lactic acidosis. UA is concerning for UTI. CXR is negative for acute findings. CT abdomen and pelvis shows mildly hypodense prostate without abscess.   He will be admitted to Hospital Medicine for further evaluation.     Review of Systems   Constitutional: Positive for activity change, chills and fatigue. Negative for appetite change, diaphoresis and fever.   HENT: Negative.    Eyes: Negative for visual disturbance.   Respiratory: Negative for cough, chest tightness, shortness of breath and wheezing.    Cardiovascular: Negative for chest pain, palpitations and leg swelling.   Gastrointestinal: Negative for abdominal distention, abdominal pain, constipation, diarrhea, nausea and vomiting.   Genitourinary: Positive for decreased urine volume, difficulty urinating, dysuria, frequency and urgency.   Musculoskeletal: Positive for myalgias. Negative for arthralgias and gait problem.   Skin: Negative for color change, pallor and rash.   Neurological: Positive for dizziness. Negative for speech  difficulty, weakness, light-headedness and headaches.   Psychiatric/Behavioral: Negative for confusion. The patient is not nervous/anxious.         COVID-19 RISK SCREEN     1. Has the patient had close contact without PPE with a lab confirmed COVID-19 (+) person or a person under investigation (PUI) for COVID-19 infection?  -- No     2. Has the patient had respiratory symptoms, worsened/new cough and/or SOA, unexplained fever, or sudden loss of smell and/or taste in the past 7 days? --  No    3. Does the patient have baseline higher exposure risk such as working in healthcare field, currently residing in healthcare facility, or ongoing hemodialysis?  --  No       All other systems reviewed and are negative.     Personal History     Past Medical History:   Diagnosis Date   • Bowel obstruction (CMS/HCC)    • Prostatitis        Past Surgical History:   Procedure Laterality Date   • ABDOMINAL SURGERY      intestinal obstruction    • APPENDECTOMY         Family History: family history includes Cancer in his mother; Diabetes in his father; Heart disease in his father; Stroke in his father. Otherwise pertinent FHx was reviewed and unremarkable.     Social History:  reports that he has never smoked. He does not have any smokeless tobacco history on file. He reports that he drinks alcohol. He reports that he does not use drugs.  Social History     Social History Narrative   • Not on file       Medications:  Available home medication information reviewed.    (Not in a hospital admission)    No Known Allergies    Objective   Objective     Vital Signs:   Temp:  [97.8 °F (36.6 °C)] 97.8 °F (36.6 °C)  Heart Rate:  [] 76  Resp:  [22] 22  BP: (90-98)/(58-67) 93/58        Physical Exam   Constitutional: He is oriented to person, place, and time. He appears well-developed and well-nourished. No distress.   HENT:   Head: Normocephalic and atraumatic.   Eyes: Pupils are equal, round, and reactive to light.   Neck: Normal range of  motion. Neck supple. No JVD present.   Cardiovascular: Normal rate, regular rhythm, normal heart sounds and intact distal pulses. Exam reveals no gallop and no friction rub.   No murmur heard.  Pulmonary/Chest: Effort normal and breath sounds normal. No respiratory distress. He has no wheezes. He has no rales.   Abdominal: Soft. Bowel sounds are normal. He exhibits no distension. There is no tenderness. There is no guarding.   Musculoskeletal: Normal range of motion. He exhibits no edema or tenderness.   Neurological: He is alert and oriented to person, place, and time.   Skin: Skin is warm and dry. Capillary refill takes less than 2 seconds. No rash noted. No erythema.   Psychiatric: He has a normal mood and affect. His behavior is normal. Thought content normal.   Vitals reviewed.         Results Reviewed:  I have personally reviewed current lab and radiology data.    Results from last 7 days   Lab Units 08/13/20 1919   WBC 10*3/mm3 7.79   HEMOGLOBIN g/dL 13.4   HEMATOCRIT % 42.7   PLATELETS 10*3/mm3 166     Results from last 7 days   Lab Units 08/13/20 1919   SODIUM mmol/L 135*   POTASSIUM mmol/L 3.4*   CHLORIDE mmol/L 102   CO2 mmol/L 18.0*   BUN mg/dL 26*   CREATININE mg/dL 2.29*   GLUCOSE mg/dL 168*   CALCIUM mg/dL 9.4   ALT (SGPT) U/L 21   AST (SGOT) U/L 27   LACTATE mmol/L 2.8*     Estimated Creatinine Clearance: 43.8 mL/min (A) (by C-G formula based on SCr of 2.29 mg/dL (H)).  Brief Urine Lab Results  (Last result in the past 365 days)      Color   Clarity   Blood   Leuk Est   Nitrite   Protein   CREAT   Urine HCG        08/13/20 2145 Yellow Cloudy Trace Moderate (2+) Negative 30 mg/dL (1+)             Imaging Results (Last 24 Hours)     Procedure Component Value Units Date/Time    CT Abdomen Pelvis With Contrast [[id number]] Collected:  08/13/20 2031     Updated:  08/13/20 2033    Narrative:       CT Abdomen Pelvis W    INDICATION:   Lower abdomen pain today. Possible prostatitis with abscess. Frequent  urination    TECHNIQUE:   CT of the abdomen and pelvis with IV contrast. Coronal and sagittal reconstructions were obtained.  Radiation dose reduction techniques included automated exposure control or exposure modulation based on body size. Count of known CT and cardiac nuc med  studies performed in previous 12 months: 0.     COMPARISON:   None available.    FINDINGS:  Abdomen: Lung bases are clear. Liver, gallbladder, spleen, pancreas, adrenal glands and kidneys are normal in appearance. The aorta is normal in size. There is no adenopathy. The bowel is unremarkable.    Pelvis: Bladder is normal. Prostate gland is mild prominence. It measures 5.2 cm maximum dimension. Most of the prostate tissue is slightly hypodense. No abscess is visible.      Impression:       Prostate gland appears be slightly lower in density than usual. It is slightly prominent measuring 5.1 cm maximum dimension.    No abscess is visible.    Otherwise study is normal          Signer Name: Roman Franco MD   Signed: 8/13/2020 8:31 PM   Workstation Name: [facility]    [facility]    XR Chest 1 View [[id number]] Collected:  08/13/20 2012     Updated:  08/13/20 2020    Narrative:       CR Chest 1 Vw    INDICATION:   Sepsis with painful urination. Chills and sweating off and on for 2 days.     COMPARISON:    None available.    FINDINGS:  Single portable AP view(s) of the chest.  The heart and mediastinal contours are normal. The lungs are clear. No pneumothorax or pleural effusion.      Impression:       No acute cardiopulmonary findings.    Signer Name: WHITNEY Quezada MD   Signed: 8/13/2020 8:12 PM   Workstation Name: [facility]    [facility]             Assessment/Plan   Assessment & Plan     Active Hospital Problems    Diagnosis POA   • **Sepsis (CMS/HCC) [A41.9] Yes   • Acute UTI (urinary tract infection) [N39.0] Yes   • Lactic acidosis [E87.2] Yes   • KAMINI (acute kidney injury) (CMS/HCC)  [N17.9] Yes   • Hypotension [I95.9] Yes   • Elevated alkaline phosphatase level [R74.8] Yes   • Hypokalemia [E87.6] Yes   • Hyperglycemia [R73.9] Yes     59 year old male presenting to the ED with 3 day history of chills, myalgias, dysuria, and urinary frequency who is found to have concern for Sepsis secondary to UTI and KAMINI.     Sepsis: Tachycardia, lactic acidosis, hypotension. Source: UTI   -Zosyn started in the ED, will continue for now  -BC x 2 pending   -urine cx  -IVF bolus (1500ml while in ED, one additional liter still pending)   -NS @ 125 for now  -CBC, BMP in am   -reflex lactic pending   -Monitor BP closely   -FC insertion for now secondary to retention and need for strict I/O    KAMINI  -IVF overnight   -Urine studies  -BMP in am     Hypokalemia   -check mg+  -Hold replacement for now given KAMINI, repeat in am   -BMP in am     Hyperglycemia   -HgA1c in am     DVT prophylaxis:  Heparin       CODE STATUS:    Code Status and Medical Interventions:   Ordered at: 08/14/20 0000     Code Status:    CPR     Medical Interventions (Level of Support Prior to Arrest):    Full       Admission Status:  I believe this patient meets INPATIENT status due to need of iv antibiotics, fluids.  I feel patient’s risk for adverse outcomes and need for care warrant INPATIENT evaluation and I predict the patient’s care encounter to likely last beyond 2 midnights.      Electronically signed by PADILLA Suggs, 08/13/20, 11:29 PM.

## 2020-08-14 NOTE — CONSULTS
"INFECTIOUS DISEASE CONSULT/INITIAL HOSPITAL VISIT    Danilo Heath  1960  [id number]    Date of Consult: 8/14/2020    Admission Date: 8/13/2020      Requesting Provider: Dr. Salvador Matthews  Evaluating Physician: Dr. Sudhir Kidd    Reason for Consultation: Sepsis    History of present illness:    Mr. Danilo Heath is a 59 y.o. male is being evaluated for sepsis.  He has a past medical history significant for bowel obstruction prostatitis.  He presented to the ED on 8/13/2020 with complaints of dysuria, frequency, urgency, myalgia and chills that developed approximately 3 days ago.  Patient also endorsed having feelings of dizziness and generally feels \"unwell\".  He denied any recent travel or any known sick contacts.  He denied any shortness of breath.  Patient states that he saw his primary care provider, Dr. Pickering, this past Tuesday and received a urine culture which has reportedly turned positive for E. coli (data deficient) as well as a COVID-19 test that was reportedly negative (data deficient).     Since arrival Patient has been afebrile with hypotension noted.  Patient is with worsening neutrophilic leukocytosis with a current WBC 12.73, elevated lactate of 2.3. Alkaline phosphatase is elevated at 130. Acute kidney injury with a current creatinine of 2.25.  UA from 8/13 showed 3+ bacteria, too numerous to count WBCs, negative nitrites and moderate leukocyte esterase.  Urine cultures are currently in process.  Chlamydia/gonorrhea are currently in progress.  8/13 blood cultures have turned positive in 1/4 bottles for gram-negative bacilli identified as E. coli by bio fire report.    Radiologically he received a CT of the abdomen pelvis with contrast which showed a slightly hypodense prostate gland and no visible abscess otherwise normal study.  8/13 chest x-ray was negative.    He has no known allergies and has received a one-time dose of Zosyn and Rocephin 1 g IV every 24 hours.  ID has been consulted for " further evaluation and treatment as well as antimicrobial therapy management.      Patient has history of prostatitis, denies nocturia, denies active abdominal pain, pelvic pain.     Denies hardware in body.    Has history of abdominal surgeries in 1970s for appendiitis and bowel obstructions    Past Medical History:   Diagnosis Date   • Bowel obstruction (CMS/HCC)    • Prostatitis        Past Surgical History:   Procedure Laterality Date   • ABDOMINAL SURGERY      intestinal obstruction    • APPENDECTOMY         Family History   Problem Relation Age of Onset   • Cancer Mother    • Heart disease Father    • Stroke Father    • Diabetes Father        Social History     Socioeconomic History   • Marital status:      Spouse name: Not on file   • Number of children: Not on file   • Years of education: Not on file   • Highest education level: Not on file   Tobacco Use   • Smoking status: Never Smoker   Substance and Sexual Activity   • Alcohol use: Yes     Comment: SOCIALLY- WEEKLY    • Drug use: Never       No Known Allergies      Medication:    Current Facility-Administered Medications:   •  acetaminophen (TYLENOL) tablet 650 mg, 650 mg, Oral, Q4H PRN, Asia Pettit APRN  •  cefTRIAXone (ROCEPHIN) 1 g/100 mL 0.9% NS (MBP), 1 g, Intravenous, Q24H, Salvador Matthews MD, 1 g at 08/14/20 0904  •  heparin (porcine) 5000 UNIT/ML injection 5,000 Units, 5,000 Units, Subcutaneous, Q8H, Asia Pettit APRN, 5,000 Units at 08/14/20 0633  •  sodium chloride 0.9 % flush 10 mL, 10 mL, Intravenous, PRN, Gregorio Quezada MD  •  sodium chloride 0.9 % flush 10 mL, 10 mL, Intravenous, Q12H, Asia Pettit APRN, 10 mL at 08/14/20 0157  •  sodium chloride 0.9 % flush 10 mL, 10 mL, Intravenous, PRN, Asia Pettit APRN  •  sodium chloride 0.9 % infusion, 100 mL/hr, Intravenous, Continuous, Salvador Matthews MD, Last Rate: 100 mL/hr at 08/14/20 0905, 100 mL/hr at 08/14/20 0905    Antibiotics:  Anti-Infectives (From  admission, onward)    Ordered     Dose/Rate Route Frequency Start Stop    20 0833  cefTRIAXone (ROCEPHIN) 1 g/100 mL 0.9% NS (MBP)  Review   Ordering Provider:  Salvador Matthews MD    1 g  over 30 Minutes Intravenous Every 24 Hours 20 0900 20 0859    20 2138  piperacillin-tazobactam (ZOSYN) 4.5 g in iso-osmotic dextrose 100 mL IVPB (premix)     Ordering Provider:  Gregorio Quezada MD    4.5 g  200 mL/hr over 30 Minutes Intravenous Once 200 20 2257            Review of Systems:  Constitutional--positive for chills.  HEENT-- No new vision, hearing or throat complaints.  No epistaxis or oral sores.  Denies odynophagia or dysphagia. No headache, photophobia or neck stiffness.  CV-- No chest pain, palpitation or syncope  Resp-- No SOB/cough/Hemoptysis  GI- No nausea, vomiting, or diarrhea.  No hematochezia, melena, or hematemesis. Denies jaundice or chronic liver disease.  --positive for dysuria, frequency.  Lymph- no swollen lymph nodes in neck/axilla or groin.   Heme- No active bruising or bleeding; no Hx of DVT or PE.  MS-- no swelling or pain in the bones or joints of arms/legs.  No new back pain.  Neuro--positive for dizziness.  No seizures.  Skin--No rashes or lesions    Reports headache but mild, no neck stiffness or photophobia  Has no nocturia  Physical Exam:   Vital Signs  Temp (24hrs), Av.8 °F (36.6 °C), Min:97.4 °F (36.3 °C), Max:98 °F (36.7 °C)    Temp  Min: 97.4 °F (36.3 °C)  Max: 98 °F (36.7 °C)  BP  Min: 86/59  Max: 110/66  Pulse  Min: 53  Max: 113  Resp  Min: 16  Max: 22  SpO2  Min: 90 %  Max: 98 %    GENERAL: Awake and alert, in no acute distress.  Pleasant, younger than stated age  HEENT: Normocephalic, atraumatic.  PERRL. EOMI. No conjunctival injection. No icterus. No ext oral lesions    HEART: RRR; No murmur,  LUNGS: Clear to auscultation bilaterally   ABDOMEN: Soft, nontender, nondistended.   EXT:  No cyanosis, clubbing or edema. No cord.  :  Without  Crane catheter.  MSK: FROM without joint effusions noted arms/legs.    SKIN: Warm and dry without cutaneous eruptions on Inspection/palpation.    NEURO: Oriented to PPT. No focal deficits on motor/sensory exam at arms/legs.  PSYCHIATRIC: Normal insight and judgement. Cooperative with PE    Laboratory Data    Results from last 7 days   Lab Units 08/14/20  0206 08/13/20 1919   WBC 10*3/mm3 12.73* 7.79   HEMOGLOBIN g/dL 11.3* 13.4   HEMATOCRIT % 37.0* 42.7   PLATELETS 10*3/mm3 148 166     Results from last 7 days   Lab Units 08/14/20  0206   SODIUM mmol/L 136   POTASSIUM mmol/L 4.0   CHLORIDE mmol/L 104   CO2 mmol/L 22.0   BUN mg/dL 26*   CREATININE mg/dL 2.25*   GLUCOSE mg/dL 137*   CALCIUM mg/dL 8.7     Results from last 7 days   Lab Units 08/13/20 1919   ALK PHOS U/L 130*   BILIRUBIN mg/dL 0.8   ALT (SGPT) U/L 21   AST (SGOT) U/L 27             Results from last 7 days   Lab Units 08/14/20  0206   LACTATE mmol/L 2.3*             Estimated Creatinine Clearance: 49.3 mL/min (A) (by C-G formula based on SCr of 2.25 mg/dL (H)).      Microbiology:  Microbiology Results (last 10 days)     Procedure Component Value - Date/Time    Eosinophil Smear - Urine, Urine, Clean Catch [[id number]]  (Normal) Collected:  08/13/20 2145    Lab Status:  Final result Specimen:  Urine, Clean Catch Updated:  08/14/20 0305     Eosinophil Smear 0 % EOS/100 Cells     Narrative:       No eosinophil seen    Blood Culture - Blood, Arm, Right [[id number]]  (Abnormal) Collected:  08/13/20 1925    Lab Status:  Preliminary result Specimen:  Blood from Arm, Right Updated:  08/14/20 1016     Blood Culture Abnormal Stain     Gram Stain Anaerobic Bottle Gram negative bacilli    Blood Culture - Blood, Arm, Left [[id number]]  (Abnormal) Collected:  08/13/20 1910    Lab Status:  Preliminary result Specimen:  Blood from Arm, Left Updated:  08/14/20 0909     Blood Culture Abnormal Stain     Gram Stain Aerobic Bottle Gram negative bacilli    Blood Culture ID,  PCR - Blood, Arm, Left [[id number]]  (Abnormal) Collected:  08/13/20 1910    Lab Status:  Final result Specimen:  Blood from Arm, Left Updated:  08/14/20 1018     BCID, PCR Escherichia coli. Identification by BCID PCR.                Radiology:  Imaging Results (Last 72 Hours)     Procedure Component Value Units Date/Time    CT Abdomen Pelvis With Contrast [[id number]] Collected:  08/13/20 2031     Updated:  08/13/20 2033    Narrative:       CT Abdomen Pelvis W    INDICATION:   Lower abdomen pain today. Possible prostatitis with abscess. Frequent urination    TECHNIQUE:   CT of the abdomen and pelvis with IV contrast. Coronal and sagittal reconstructions were obtained.  Radiation dose reduction techniques included automated exposure control or exposure modulation based on body size. Count of known CT and cardiac nuc med  studies performed in previous 12 months: 0.     COMPARISON:   None available.    FINDINGS:  Abdomen: Lung bases are clear. Liver, gallbladder, spleen, pancreas, adrenal glands and kidneys are normal in appearance. The aorta is normal in size. There is no adenopathy. The bowel is unremarkable.    Pelvis: Bladder is normal. Prostate gland is mild prominence. It measures 5.2 cm maximum dimension. Most of the prostate tissue is slightly hypodense. No abscess is visible.      Impression:       Prostate gland appears be slightly lower in density than usual. It is slightly prominent measuring 5.1 cm maximum dimension.    No abscess is visible.    Otherwise study is normal          Signer Name: Roman Franco MD   Signed: 8/13/2020 8:31 PM   Workstation Name: [facility]-    [facility]    XR Chest 1 View [[id number]] Collected:  08/13/20 2012     Updated:  08/13/20 2020    Narrative:       CR Chest 1 Vw    INDICATION:   Sepsis with painful urination. Chills and sweating off and on for 2 days.     COMPARISON:    None available.    FINDINGS:  Single portable AP view(s) of the chest.  The  heart and mediastinal contours are normal. The lungs are clear. No pneumothorax or pleural effusion.      Impression:       No acute cardiopulmonary findings.    Signer Name: WHITNEY Quezada MD   Signed: 2020 8:12 PM   Workstation Name: [facility]    [facility] [place]            Impression:   1. Sepsis presenting with UTI with pyuria, neutrophilic leukocytosis, hypotension, lymphopenia, acute kidney injury anemia and lactic acidosis  2. Bacteremia secondary to urinary source-blood cultures positive in 1/4 bottles for gram-negative bacilli identified as E. coli by bio fire report.  3. probablye Prostatitis  4. Acute urinary tract infection  5. Hypotension  6. Anemia  7. Elevated alkaline phosphatase  8. Hypokalemia/hyponatremia  9. E. Coli UTI at PCP's office  PLAN/RECOMMENDATIONS:   Thank you for asking us to see Dnailo Heath, I recommend the followin. Continue to follow blood and urine cultures as well as sensitivity reports and adjust antibiotics accordingly.  2. Follow chlamydia/gonorrhea testing.  3. Continue to follow labs while in hospital to include CBC, CMP, ESR, CRP and procalcitonin in the a.m.    change zosyn to rocephin 2 g iv daily  F/u culture results  Check psa  Given postive blood cultures anticipate need for 7-14 d of iv abx  May change to oral abx thereafter;  If PSA elevated will give longer course for prostatitis    I have seen and examined patient and agree with note written by Rangel CAUSEY    D/w PADILLA Acosta  2020  10:24

## 2020-08-14 NOTE — PROGRESS NOTES
Discharge Planning Assessment  [facility]     Patient Name: Danilo Heath  MRN: [id number]  Today's Date: 8/14/2020    Admit Date: 8/13/2020    Discharge Needs Assessment     Row Name 08/14/20 1020       Living Environment    Lives With  spouse pt resides in [facility]    Name(s) of Who Lives With Patient  wife- Suzanna Brock    Current Living Arrangements  home/apartment/condo    Primary Care Provided by  self    Provides Primary Care For  no one    Family Caregiver if Needed  spouse    Family Caregiver Names  Suzanna    Quality of Family Relationships  helpful;involved;supportive    Able to Return to Prior Arrangements  yes       Resource/Environmental Concerns    Resource/Environmental Concerns  none       Transition Planning    Patient/Family Anticipates Transition to  home with family    Transportation Anticipated  family or friend will provide       Discharge Needs Assessment    Readmission Within the Last 30 Days  no previous admission in last 30 days    Concerns to be Addressed  denies needs/concerns at this time;discharge planning    Equipment Currently Used at Home  none    Anticipated Changes Related to Illness  none    Equipment Needed After Discharge  none    Provided Post Acute Provider List?  N/A        Discharge Plan     Row Name 08/14/20 1021       Plan    Plan  home    Provided Post Acute Provider Quality & Resource List?  N/A    Patient/Family in Agreement with Plan  yes    Plan Comments  CM spoke with pt at bedside. Pt resides in [facility] with his wife Suzanna Brock. Pt reports he is independent of adls and denies use of DME. Pt is not current with home health or outpatient services. Pt plans to return home and denies needs at this time. Pt will have private transportation home at d/c. CM will conitnue to follow.     Final Discharge Disposition Code  01 - home or self-care        Destination      Coordination has not been started for this encounter.      Durable Medical Equipment       Coordination has not been started for this encounter.      Dialysis/Infusion      Coordination has not been started for this encounter.      Home Medical Care      Coordination has not been started for this encounter.      Therapy      Coordination has not been started for this encounter.      Community Resources      Coordination has not been started for this encounter.          Demographic Summary     Row Name 08/14/20 1017       General Information    Referral Source  admission list    Reason for Consult  discharge planning    Preferred Language  English    General Information Comments  PCP- Naveed Cole       Contact Information    Permission Granted to Share Info With          Functional Status     Row Name 08/14/20 1019       Functional Status    Usual Activity Tolerance  good    Current Activity Tolerance  moderate       Functional Status, IADL    Medications  independent    Meal Preparation  independent    Housekeeping  independent    Laundry  independent    Shopping  independent       Employment/    Employment/ Comments  pt confirms he has [employer], denies concerns or disruption in coverage. Pt has prescription drug coverage and denies issues obtaining or affording current medications.         Psychosocial    No documentation.       Abuse/Neglect    No documentation.       Legal    No documentation.       Substance Abuse    No documentation.       Patient Forms    No documentation.           María Guthrie RN

## 2020-08-14 NOTE — PROGRESS NOTES
[facility] Medicine Services  PROGRESS NOTE    Patient Name: Danilo Heath  : 1960  MRN: [id number]    Date of Admission: 2020  Primary Care Physician: ROSS Cole MD    Subjective   Subjective     CC:  Fever, dysuria    HPI:  Wife at bedside. Feels a little better since admission, but still a little dizzy.  Blood pressure is up to .  No fever since admission.  Voiding ok, still some dysuria    Review of Systems  Gen- + fevers, chills  CV- No chest pain, palpitations  Resp- No cough, dyspnea  GI- No N/V/D, abd pain    Objective   Objective     Vital Signs:   Temp:  [97.4 °F (36.3 °C)-98 °F (36.7 °C)] 98 °F (36.7 °C)  Heart Rate:  [] 53  Resp:  [18-22] 18  BP: ()/(51-70) 92/51        Physical Exam:  Constitutional: No acute distress, awake, alert, sitting up in bed eating breakfast  HENT: NCAT, mucous membranes moist  Respiratory: Clear to auscultation bilaterally, respiratory effort normal   Cardiovascular: RRR, no murmurs, rubs, or gallops  Gastrointestinal: Positive bowel sounds, soft, nontender, nondistended  Musculoskeletal: No bilateral ankle edema  Psychiatric: Appropriate affect, cooperative  Neurologic: Oriented x 3, no focal deficits  Skin: No rashes      Results Reviewed:  Results from last 7 days   Lab Units 20  0206 20   WBC 10*3/mm3 12.73* 7.79   HEMOGLOBIN g/dL 11.3* 13.4   HEMATOCRIT % 37.0* 42.7   PLATELETS 10*3/mm3 148 166     Results from last 7 days   Lab Units 20  0206 20   SODIUM mmol/L 136 135*   POTASSIUM mmol/L 4.0 3.4*   CHLORIDE mmol/L 104 102   CO2 mmol/L 22.0 18.0*   BUN mg/dL 26* 26*   CREATININE mg/dL 2.25* 2.29*   GLUCOSE mg/dL 137* 168*   CALCIUM mg/dL 8.7 9.4   ALT (SGPT) U/L  --  21   AST (SGOT) U/L  --  27     Estimated Creatinine Clearance: 49.3 mL/min (A) (by JORDON-G formula based on SCr of 2.25 mg/dL (H)).    Microbiology Results Abnormal     Procedure Component Value - Date/Time     Eosinophil Smear - Urine, Urine, Clean Catch [[id number]]  (Normal) Collected:  08/13/20 2145    Lab Status:  Final result Specimen:  Urine, Clean Catch Updated:  08/14/20 0305     Eosinophil Smear 0 % EOS/100 Cells     Narrative:       No eosinophil seen          Imaging Results (Last 24 Hours)     Procedure Component Value Units Date/Time    CT Abdomen Pelvis With Contrast [[id number]] Collected:  08/13/20 2031     Updated:  08/13/20 2033    Narrative:       CT Abdomen Pelvis W    INDICATION:   Lower abdomen pain today. Possible prostatitis with abscess. Frequent urination    TECHNIQUE:   CT of the abdomen and pelvis with IV contrast. Coronal and sagittal reconstructions were obtained.  Radiation dose reduction techniques included automated exposure control or exposure modulation based on body size. Count of known CT and cardiac nuc med  studies performed in previous 12 months: 0.     COMPARISON:   None available.    FINDINGS:  Abdomen: Lung bases are clear. Liver, gallbladder, spleen, pancreas, adrenal glands and kidneys are normal in appearance. The aorta is normal in size. There is no adenopathy. The bowel is unremarkable.    Pelvis: Bladder is normal. Prostate gland is mild prominence. It measures 5.2 cm maximum dimension. Most of the prostate tissue is slightly hypodense. No abscess is visible.      Impression:       Prostate gland appears be slightly lower in density than usual. It is slightly prominent measuring 5.1 cm maximum dimension.    No abscess is visible.    Otherwise study is normal          Signer Name: Rmoan Franco MD   Signed: 8/13/2020 8:31 PM   Workstation Name: [facility]-    [facility] [place]    XR Chest 1 View [[id number]] Collected:  08/13/20 2012     Updated:  08/13/20 2020    Narrative:       CR Chest 1 Vw    INDICATION:   Sepsis with painful urination. Chills and sweating off and on for 2 days.     COMPARISON:    None available.    FINDINGS:  Single portable AP view(s) of  "the chest.  The heart and mediastinal contours are normal. The lungs are clear. No pneumothorax or pleural effusion.      Impression:       No acute cardiopulmonary findings.    Signer Name: WHITNEY Quezada MD   Signed: 8/13/2020 8:12 PM   Workstation Name: [email or web address]-    [facility] [place]               I have reviewed the medications:  Scheduled Meds:  heparin (porcine) 5,000 Units Subcutaneous Q8H   piperacillin-tazobactam 3.375 g Intravenous Q8H   sodium chloride 10 mL Intravenous Q12H     Continuous Infusions:  sodium chloride 125 mL/hr Last Rate: 125 mL/hr (08/14/20 0606)     PRN Meds:.•  acetaminophen  •  sodium chloride  •  sodium chloride    Assessment/Plan   Assessment & Plan     Active Hospital Problems    Diagnosis  POA   • **Sepsis (CMS/HCC) [A41.9]  Yes     Priority: Low   • Prostatitis [N41.9]  Yes     Priority: Medium   • Acute UTI (urinary tract infection) [N39.0]  Yes     Priority: Medium   • Lactic acidosis [E87.2]  Yes   • Acute renal failure superimposed on stage 3 chronic kidney disease (CMS/HCC) [N17.9, N18.3]  Yes   • Hypotension [I95.9]  Yes   • Hypokalemia [E87.6]  Yes   • Hyperglycemia [R73.9]  Yes      Resolved Hospital Problems   No resolved problems to display.        Brief Hospital Course to date:  Danilo Heath is a 59 y.o. male with hx of CKD3 presents with fevers, chills, and dysuria for 2 days.    - has evidence of UTI/possible prostatitis.  Change to rocephin and await cultures.  Did have rigors at home, concern for bacteremia.  WBC up to 12k this morning, check AM.  - has 2-3 instances of UTI/prostatisis over last 20 years.  May consider f/u urology as outpatient.  - baseline Cr in 2017 was 1.6.  Has previously seen NAL (last ~4 years ago) and was told he just \"had small kidneys\".  Cr 2.29 on admission.  Unclear if this is acute or rather just progression of CKD.  Check AM.  Discussed that he needs to f/u with NAL after discharge.  - BP is improved this AM.  " Continue current IVF.  - mild hyperglycemia, a1c=6.1%.  Will need to f/u PCP for further management/monitoring.  - K+ improved    - overall, doing well.  Plan home tomorrow if no issues.    ADDENDUM: Blood cultures returned with GNR.  Final identification pending.  Will consult ID, alexis velasquez need course of IV abx after discharge.    DVT Prophylaxis:  heparin      Disposition: I expect the patient to be discharged home tomorrow.    CODE STATUS:   Code Status and Medical Interventions:   Ordered at: 08/14/20 0000     Code Status:    CPR     Medical Interventions (Level of Support Prior to Arrest):    Full       Electronically signed by Salvador Matthews MD, 08/14/20, 08:28.

## 2020-08-15 LAB
ALBUMIN SERPL-MCNC: 3.5 G/DL (ref 3.5–5.2)
ALBUMIN/GLOB SERPL: 0.9 G/DL
ALP SERPL-CCNC: 70 U/L (ref 39–117)
ALT SERPL W P-5'-P-CCNC: 17 U/L (ref 1–41)
ANION GAP SERPL CALCULATED.3IONS-SCNC: 12 MMOL/L (ref 5–15)
AST SERPL-CCNC: 26 U/L (ref 1–40)
BASOPHILS # BLD AUTO: 0.05 10*3/MM3 (ref 0–0.2)
BASOPHILS NFR BLD AUTO: 0.4 % (ref 0–1.5)
BILIRUB SERPL-MCNC: 0.5 MG/DL (ref 0–1.2)
BUN SERPL-MCNC: 21 MG/DL (ref 8–23)
BUN/CREAT SERPL: 11.5 (ref 7–25)
CALCIUM SPEC-SCNC: 8.9 MG/DL (ref 8.6–10.5)
CHLORIDE SERPL-SCNC: 107 MMOL/L (ref 98–107)
CO2 SERPL-SCNC: 19 MMOL/L (ref 22–29)
CREAT SERPL-MCNC: 1.82 MG/DL (ref 0.76–1.27)
CRP SERPL-MCNC: 19.2 MG/DL (ref 0–0.5)
DEPRECATED RDW RBC AUTO: 43.8 FL (ref 37–54)
EOSINOPHIL # BLD AUTO: 0.07 10*3/MM3 (ref 0–0.4)
EOSINOPHIL NFR BLD AUTO: 0.5 % (ref 0.3–6.2)
ERYTHROCYTE [DISTWIDTH] IN BLOOD BY AUTOMATED COUNT: 14.2 % (ref 12.3–15.4)
ERYTHROCYTE [SEDIMENTATION RATE] IN BLOOD: 78 MM/HR (ref 0–20)
GFR SERPL CREATININE-BSD FRML MDRD: 46 ML/MIN/1.73
GLOBULIN UR ELPH-MCNC: 3.8 GM/DL
GLUCOSE SERPL-MCNC: 96 MG/DL (ref 65–99)
HCT VFR BLD AUTO: 40.1 % (ref 37.5–51)
HGB BLD-MCNC: 12.4 G/DL (ref 13–17.7)
IMM GRANULOCYTES # BLD AUTO: 0.07 10*3/MM3 (ref 0–0.05)
IMM GRANULOCYTES NFR BLD AUTO: 0.5 % (ref 0–0.5)
LYMPHOCYTES # BLD AUTO: 2.34 10*3/MM3 (ref 0.7–3.1)
LYMPHOCYTES NFR BLD AUTO: 17.8 % (ref 19.6–45.3)
MCH RBC QN AUTO: 26 PG (ref 26.6–33)
MCHC RBC AUTO-ENTMCNC: 30.9 G/DL (ref 31.5–35.7)
MCV RBC AUTO: 84.1 FL (ref 79–97)
MONOCYTES # BLD AUTO: 1.23 10*3/MM3 (ref 0.1–0.9)
MONOCYTES NFR BLD AUTO: 9.3 % (ref 5–12)
NEUTROPHILS NFR BLD AUTO: 71.5 % (ref 42.7–76)
NEUTROPHILS NFR BLD AUTO: 9.4 10*3/MM3 (ref 1.7–7)
NRBC BLD AUTO-RTO: 0 /100 WBC (ref 0–0.2)
PLATELET # BLD AUTO: 171 10*3/MM3 (ref 140–450)
PMV BLD AUTO: 11.2 FL (ref 6–12)
POTASSIUM SERPL-SCNC: 3.9 MMOL/L (ref 3.5–5.2)
PROCALCITONIN SERPL-MCNC: 68.95 NG/ML (ref 0–0.25)
PROT SERPL-MCNC: 7.3 G/DL (ref 6–8.5)
RBC # BLD AUTO: 4.77 10*6/MM3 (ref 4.14–5.8)
SODIUM SERPL-SCNC: 138 MMOL/L (ref 136–145)
WBC # BLD AUTO: 13.16 10*3/MM3 (ref 3.4–10.8)

## 2020-08-15 PROCEDURE — 85025 COMPLETE CBC W/AUTO DIFF WBC: CPT | Performed by: NURSE PRACTITIONER

## 2020-08-15 PROCEDURE — 85652 RBC SED RATE AUTOMATED: CPT | Performed by: NURSE PRACTITIONER

## 2020-08-15 PROCEDURE — 25010000002 HEPARIN (PORCINE) PER 1000 UNITS: Performed by: NURSE PRACTITIONER

## 2020-08-15 PROCEDURE — 25010000002 CEFTRIAXONE PER 250 MG: Performed by: INTERNAL MEDICINE

## 2020-08-15 PROCEDURE — 80053 COMPREHEN METABOLIC PANEL: CPT | Performed by: NURSE PRACTITIONER

## 2020-08-15 PROCEDURE — 99232 SBSQ HOSP IP/OBS MODERATE 35: CPT | Performed by: INTERNAL MEDICINE

## 2020-08-15 PROCEDURE — 86140 C-REACTIVE PROTEIN: CPT | Performed by: NURSE PRACTITIONER

## 2020-08-15 PROCEDURE — 84145 PROCALCITONIN (PCT): CPT | Performed by: NURSE PRACTITIONER

## 2020-08-15 RX ADMIN — HEPARIN SODIUM 5000 UNITS: 5000 INJECTION INTRAVENOUS; SUBCUTANEOUS at 21:27

## 2020-08-15 RX ADMIN — SODIUM CHLORIDE 100 ML/HR: 9 INJECTION, SOLUTION INTRAVENOUS at 07:46

## 2020-08-15 RX ADMIN — SODIUM CHLORIDE, PRESERVATIVE FREE 10 ML: 5 INJECTION INTRAVENOUS at 21:27

## 2020-08-15 RX ADMIN — CEFTRIAXONE SODIUM 2 G: 2 INJECTION, POWDER, FOR SOLUTION INTRAMUSCULAR; INTRAVENOUS at 07:46

## 2020-08-15 NOTE — PROGRESS NOTES
"INFECTIOUS DISEASE PROGRESS NOTE   Danilo Heath  1960  [id number]    Admission Date: 8/13/2020      Requesting Provider: Dr. Salvador Matthews  Evaluating Physician: Dr. Sudhir Kidd    Reason for Consultation: Sepsis    History of present illness:    Mr. Danilo Heath is a 59 y.o. male is being evaluated for sepsis.  He has a past medical history significant for bowel obstruction prostatitis.  He presented to the ED on 8/13/2020 with complaints of dysuria, frequency, urgency, myalgia and chills that developed approximately 3 days ago.  Patient also endorsed having feelings of dizziness and generally feels \"unwell\".  He denied any recent travel or any known sick contacts.  He denied any shortness of breath.  Patient states that he saw his primary care provider, Dr. Pickering, this past Tuesday and received a urine culture which has reportedly turned positive for E. coli (data deficient) as well as a COVID-19 test that was reportedly negative (data deficient).     Since arrival Patient has been afebrile with hypotension noted.  Patient is with worsening neutrophilic leukocytosis with a current WBC 12.73, elevated lactate of 2.3. Alkaline phosphatase is elevated at 130. Acute kidney injury with a current creatinine of 2.25.  UA from 8/13 showed 3+ bacteria, too numerous to count WBCs, negative nitrites and moderate leukocyte esterase.  Urine cultures are currently in process.  Chlamydia/gonorrhea are currently in progress.  8/13 blood cultures have turned positive in 1/4 bottles for gram-negative bacilli identified as E. coli by bio fire report.    Radiologically he received a CT of the abdomen pelvis with contrast which showed a slightly hypodense prostate gland and no visible abscess otherwise normal study.  8/13 chest x-ray was negative.    8/15/20:  Tmax of 103 degrees.  He was having shaking chills. Having some diarrhea.  No abdominal cramping.    Past Medical History:   Diagnosis Date   • Bowel obstruction (CMS/HCC)  "   • Prostatitis        Past Surgical History:   Procedure Laterality Date   • ABDOMINAL SURGERY      intestinal obstruction    • APPENDECTOMY         Family History   Problem Relation Age of Onset   • Cancer Mother    • Heart disease Father    • Stroke Father    • Diabetes Father        Social History     Socioeconomic History   • Marital status:      Spouse name: Not on file   • Number of children: Not on file   • Years of education: Not on file   • Highest education level: Not on file   Tobacco Use   • Smoking status: Never Smoker   Substance and Sexual Activity   • Alcohol use: Yes     Comment: SOCIALLY- WEEKLY    • Drug use: Never       No Known Allergies      Medication:    Current Facility-Administered Medications:   •  acetaminophen (TYLENOL) tablet 650 mg, 650 mg, Oral, Q4H PRN, Asia Pettit APRN, 650 mg at 08/14/20 2346  •  cefTRIAXone (ROCEPHIN) 2 g/100 mL 0.9% NS IVPB (MBP), 2 g, Intravenous, Q24H, Salvador Matthews MD, 2 g at 08/15/20 0746  •  heparin (porcine) 5000 UNIT/ML injection 5,000 Units, 5,000 Units, Subcutaneous, Q8H, Asia Pettit APRN, 5,000 Units at 08/14/20 2146  •  sodium chloride 0.9 % flush 10 mL, 10 mL, Intravenous, PRN, Gregorio Quezada MD  •  sodium chloride 0.9 % flush 10 mL, 10 mL, Intravenous, Q12H, Asia Pettit APRN, 10 mL at 08/14/20 2150  •  sodium chloride 0.9 % flush 10 mL, 10 mL, Intravenous, PRN, Asia Pettit APRN  •  sodium chloride 0.9 % infusion, 100 mL/hr, Intravenous, Continuous, Salvador Matthews MD, Last Rate: 100 mL/hr at 08/15/20 0746, 100 mL/hr at 08/15/20 0746    Antibiotics:  Anti-Infectives (From admission, onward)    Ordered     Dose/Rate Route Frequency Start Stop    08/14/20 1024  cefTRIAXone (ROCEPHIN) 2 g/100 mL 0.9% NS IVPB (MBP)  Review   Ordering Provider:  Salvador Matthews MD    2 g  over 30 Minutes Intravenous Every 24 Hours 08/15/20 0900 08/21/20 0859    08/13/20 2138  piperacillin-tazobactam (ZOSYN) 4.5 g in iso-osmotic  dextrose 100 mL IVPB (premix)     Ordering Provider:  Gregorio Quezada MD    4.5 g  200 mL/hr over 30 Minutes Intravenous Once 20 3144            Review of Systems:  Constitutional--positive for chills.  HEENT-- No new vision, hearing or throat complaints.  No epistaxis or oral sores.  Denies odynophagia or dysphagia. No headache, photophobia or neck stiffness.  CV-- No chest pain, palpitation or syncope  Resp-- No SOB/cough/Hemoptysis  GI- No nausea, vomiting, or diarrhea.  No hematochezia, melena, or hematemesis. Denies jaundice or chronic liver disease.  --positive for dysuria, frequency.  Lymph- no swollen lymph nodes in neck/axilla or groin.   Heme- No active bruising or bleeding; no Hx of DVT or PE.  MS-- no swelling or pain in the bones or joints of arms/legs.  No new back pain.  Neuro--positive for dizziness.  No seizures.  Skin--No rashes or lesions    Reports headache but mild, no neck stiffness or photophobia  Has no nocturia  Physical Exam:   Vital Signs  Temp (24hrs), Av °F (37.8 °C), Min:98.3 °F (36.8 °C), Max:103 °F (39.4 °C)    Temp  Min: 98.3 °F (36.8 °C)  Max: 103 °F (39.4 °C)  BP  Min: 118/64  Max: 140/85  Pulse  Min: 56  Max: 95  Resp  Min: 16  Max: 18  SpO2  Min: 94 %  Max: 97 %    GENERAL: Awake and alert, in no acute distress.  Pleasant, younger than stated age  HEENT: Normocephalic, atraumatic.  PERRL. EOMI. No conjunctival injection. No icterus. No ext oral lesions    HEART: RRR; No murmur,  LUNGS: Clear to auscultation bilaterally   ABDOMEN: Soft, nontender, nondistended.   EXT:  No cyanosis, clubbing or edema. No cord.  :  Without Crane catheter.  MSK: FROM without joint effusions noted arms/legs.    SKIN: Warm and dry without cutaneous eruptions on Inspection/palpation.    NEURO: Oriented to PPT. No focal deficits on motor/sensory exam at arms/legs.  PSYCHIATRIC: Normal insight and judgement. Cooperative with PE    Laboratory Data    Results from last 7 days    Lab Units 08/15/20  0718 08/14/20  0206 08/13/20  1919   WBC 10*3/mm3 13.16* 12.73* 7.79   HEMOGLOBIN g/dL 12.4* 11.3* 13.4   HEMATOCRIT % 40.1 37.0* 42.7   PLATELETS 10*3/mm3 171 148 166     Results from last 7 days   Lab Units 08/15/20  0718   SODIUM mmol/L 138   POTASSIUM mmol/L 3.9   CHLORIDE mmol/L 107   CO2 mmol/L 19.0*   BUN mg/dL 21   CREATININE mg/dL 1.82*   GLUCOSE mg/dL 96   CALCIUM mg/dL 8.9     Results from last 7 days   Lab Units 08/15/20  0718   ALK PHOS U/L 70   BILIRUBIN mg/dL 0.5   ALT (SGPT) U/L 17   AST (SGOT) U/L 26     Results from last 7 days   Lab Units 08/15/20  0718   SED RATE mm/hr 78*     Results from last 7 days   Lab Units 08/15/20  0718   CRP mg/dL 19.20*     Results from last 7 days   Lab Units 08/14/20  0206   LACTATE mmol/L 2.3*             Estimated Creatinine Clearance: 60.9 mL/min (A) (by C-G formula based on SCr of 1.82 mg/dL (H)).      Microbiology:  Microbiology Results (last 10 days)     Procedure Component Value - Date/Time    Eosinophil Smear - Urine, Urine, Clean Catch [[id number]]  (Normal) Collected:  08/13/20 2145    Lab Status:  Final result Specimen:  Urine, Clean Catch Updated:  08/14/20 0305     Eosinophil Smear 0 % EOS/100 Cells     Narrative:       No eosinophil seen    Urine Culture - Urine, Urine, Clean Catch [[id number]]  (Abnormal) Collected:  08/13/20 2145    Lab Status:  Preliminary result Specimen:  Urine, Clean Catch Updated:  08/15/20 1056     Urine Culture >100,000 CFU/mL Escherichia coli    Blood Culture - Blood, Arm, Right [[id number]]  (Abnormal) Collected:  08/13/20 1925    Lab Status:  Preliminary result Specimen:  Blood from Arm, Right Updated:  08/15/20 0557     Blood Culture Escherichia coli     Isolated from Anaerobic Bottle     Gram Stain Anaerobic Bottle Gram negative bacilli    Blood Culture - Blood, Arm, Left [[id number]]  (Abnormal) Collected:  08/13/20 1910    Lab Status:  Preliminary result Specimen:  Blood from Arm, Left Updated:  08/15/20  0557     Blood Culture Escherichia coli     Isolated from Aerobic Bottle     Gram Stain Aerobic Bottle Gram negative bacilli    Blood Culture ID, PCR - Blood, Arm, Left [[id number]]  (Abnormal) Collected:  08/13/20 1910    Lab Status:  Final result Specimen:  Blood from Arm, Left Updated:  08/14/20 1018     BCID, PCR Escherichia coli. Identification by BCID PCR.                Radiology:  Imaging Results (Last 72 Hours)     Procedure Component Value Units Date/Time    CT Abdomen Pelvis With Contrast [[id number]] Collected:  08/13/20 2031     Updated:  08/13/20 2033    Narrative:       CT Abdomen Pelvis W    INDICATION:   Lower abdomen pain today. Possible prostatitis with abscess. Frequent urination    TECHNIQUE:   CT of the abdomen and pelvis with IV contrast. Coronal and sagittal reconstructions were obtained.  Radiation dose reduction techniques included automated exposure control or exposure modulation based on body size. Count of known CT and cardiac nuc med  studies performed in previous 12 months: 0.     COMPARISON:   None available.    FINDINGS:  Abdomen: Lung bases are clear. Liver, gallbladder, spleen, pancreas, adrenal glands and kidneys are normal in appearance. The aorta is normal in size. There is no adenopathy. The bowel is unremarkable.    Pelvis: Bladder is normal. Prostate gland is mild prominence. It measures 5.2 cm maximum dimension. Most of the prostate tissue is slightly hypodense. No abscess is visible.      Impression:       Prostate gland appears be slightly lower in density than usual. It is slightly prominent measuring 5.1 cm maximum dimension.    No abscess is visible.    Otherwise study is normal          Signer Name: Roman Franco MD   Signed: 8/13/2020 8:31 PM   Workstation Name: [facility]    [facility]    XR Chest 1 View [[id number]] Collected:  08/13/20 2012     Updated:  08/13/20 2020    Narrative:       CR Chest 1 Vw    INDICATION:   Sepsis with painful  urination. Chills and sweating off and on for 2 days.     COMPARISON:    None available.    FINDINGS:  Single portable AP view(s) of the chest.  The heart and mediastinal contours are normal. The lungs are clear. No pneumothorax or pleural effusion.      Impression:       No acute cardiopulmonary findings.    Signer Name: WHITNEY Quezada MD   Signed: 8/13/2020 8:12 PM   Workstation Name: [facility]    [facility]            Impression:   1. Sepsis presenting with UTI with pyuria, neutrophilic leukocytosis, hypotension, lymphopenia, acute kidney injury anemia and lactic acidosis  2. Bacteremia secondary to urinary source-blood cultures positive in 1/4 bottles for gram-negative bacilli identified as E. coli by bio fire report.  3. probablye Prostatitis  4. Acute urinary tract infection  5. Hypotension  6. Anemia  7. Elevated alkaline phosphatase  8. Hypokalemia/hyponatremia  9. E. Coli UTI at PCP's office  PLAN/RECOMMENDATIONS:     1. Continue to follow blood and urine cultures as well as sensitivity reports and adjust antibiotics accordingly.  2. Follow chlamydia/gonorrhea testing.  3. Continue to follow labs while in hospital to include CBC, CMP, ESR, CRP and procalcitonin in the a.m.     rocephin 2 g iv daily    Check psa-pen\ding   Given postive blood cultures anticipate need for 7-14 d of iv abx  May change to oral abx thereafter;  If PSA elevated will give longer course for prostatitis             Nasreen Lara, PADILLA  8/15/2020  11:12

## 2020-08-15 NOTE — PROGRESS NOTES
[facility]  PROGRESS NOTE    Patient Name: Danilo Heath  : 1960  MRN: [id number]    Date of Admission: 2020  Primary Care Physician: ROSS Cole MD    Subjective   Subjective     CC:  Fever, dysuria    HPI:  Wife at bedside. Was febrile overnight to 103.  Described some shaking and rigors with the temp.  Better today, no fever so far.    Review of Systems  Gen- + fevers, chills  CV- No chest pain, palpitations  Resp- No cough, dyspnea  GI- No N/V/D, abd pain    Objective   Objective     Vital Signs:   Temp:  [98.3 °F (36.8 °C)-103 °F (39.4 °C)] 98.4 °F (36.9 °C)  Heart Rate:  [56-95] 71  Resp:  [16-18] 16  BP: (118-140)/(64-90) 129/90        Physical Exam:  Constitutional: No acute distress, awake, alert, sitting up in bed watching tv  HENT: NCAT, mucous membranes moist  Respiratory: Clear to auscultation bilaterally, respiratory effort normal   Cardiovascular: RRR, no murmurs, rubs, or gallops  Gastrointestinal: Positive bowel sounds, soft, nontender, nondistended  Musculoskeletal: No bilateral ankle edema  Psychiatric: Appropriate affect, cooperative  Neurologic: Oriented x 3, no focal deficits  Skin: No rashes      Results Reviewed:  Results from last 7 days   Lab Units 08/15/20  0718 08/14/20  0206 08/13/20  1919   WBC 10*3/mm3 13.16* 12.73* 7.79   HEMOGLOBIN g/dL 12.4* 11.3* 13.4   HEMATOCRIT % 40.1 37.0* 42.7   PLATELETS 10*3/mm3 171 148 166   PROCALCITONIN ng/mL 68.95*  --   --      Results from last 7 days   Lab Units 08/15/20  0718 08/14/20  0206 08/13/20  1919   SODIUM mmol/L 138 136 135*   POTASSIUM mmol/L 3.9 4.0 3.4*   CHLORIDE mmol/L 107 104 102   CO2 mmol/L 19.0* 22.0 18.0*   BUN mg/dL 21 26* 26*   CREATININE mg/dL 1.82* 2.25* 2.29*   GLUCOSE mg/dL 96 137* 168*   CALCIUM mg/dL 8.9 8.7 9.4   ALT (SGPT) U/L 17  --  21   AST (SGOT) U/L 26  --  27     Estimated Creatinine Clearance: 60.9 mL/min (A) (by C-G formula based on SCr of 1.82 mg/dL  (H)).    Microbiology Results Abnormal     Procedure Component Value - Date/Time    Blood Culture - Blood, Arm, Right [[id number]]  (Abnormal) Collected:  08/13/20 1925    Lab Status:  Preliminary result Specimen:  Blood from Arm, Right Updated:  08/15/20 1412     Blood Culture Escherichia coli     Isolated from Anaerobic Bottle     Gram Stain Anaerobic Bottle Gram negative bacilli      Aerobic Bottle Gram negative bacilli    Urine Culture - Urine, Urine, Clean Catch [[id number]]  (Abnormal) Collected:  08/13/20 2145    Lab Status:  Preliminary result Specimen:  Urine, Clean Catch Updated:  08/15/20 1056     Urine Culture >100,000 CFU/mL Escherichia coli    Blood Culture - Blood, Arm, Left [[id number]]  (Abnormal) Collected:  08/13/20 1910    Lab Status:  Preliminary result Specimen:  Blood from Arm, Left Updated:  08/15/20 0557     Blood Culture Escherichia coli     Isolated from Aerobic Bottle     Gram Stain Aerobic Bottle Gram negative bacilli    Blood Culture ID, PCR - Blood, Arm, Left [[id number]]  (Abnormal) Collected:  08/13/20 1910    Lab Status:  Final result Specimen:  Blood from Arm, Left Updated:  08/14/20 1018     BCID, PCR Escherichia coli. Identification by BCID PCR.    Eosinophil Smear - Urine, Urine, Clean Catch [[id number]]  (Normal) Collected:  08/13/20 2145    Lab Status:  Final result Specimen:  Urine, Clean Catch Updated:  08/14/20 0305     Eosinophil Smear 0 % EOS/100 Cells     Narrative:       No eosinophil seen          Imaging Results (Last 24 Hours)     ** No results found for the last 24 hours. **               I have reviewed the medications:  Scheduled Meds:    cefTRIAXone 2 g Intravenous Q24H   heparin (porcine) 5,000 Units Subcutaneous Q8H   sodium chloride 10 mL Intravenous Q12H     Continuous Infusions:    sodium chloride 100 mL/hr Last Rate: 100 mL/hr (08/15/20 0746)     PRN Meds:.•  acetaminophen  •  sodium chloride  •  sodium chloride    Assessment/Plan   Assessment & Plan     Active  "Hospital Problems    Diagnosis  POA   • **Sepsis (CMS/[facility]) [A41.9]  Yes     Priority: Low   • Prostatitis [N41.9]  Yes     Priority: Medium   • Acute UTI (urinary tract infection) [N39.0]  Yes     Priority: Medium   • Gram-negative bacteremia [R78.81]  Yes   • Lactic acidosis [E87.2]  Yes   • Acute renal failure superimposed on stage 3 chronic kidney disease (CMS/[facility]) [N17.9, N18.3]  Yes   • Hypotension [I95.9]  Yes   • Hypokalemia [E87.6]  Yes   • Hyperglycemia [R73.9]  Yes      Resolved Hospital Problems   No resolved problems to display.        Brief Hospital Course to date:  Danilo Heath is a 59 y.o. male with hx of CKD3 presents with fevers, chills, and dysuria for 2 days.    - Cultures with e coli, sensitivities pending.  Appreciate ID eval - continue 2g rocephin daily.  WBC slight trend up.  Given that and fever will watch overnight.  Discussed with ID.  - has 2-3 instances of UTI/prostatisis over last 20 years.  PSA=20, would rec outpatient urology follow up.  - baseline Cr in 2017 was 1.6.  Has previously seen [facility] (last ~4 years ago) and was told he just \"had small kidneys\".  Cr 2.29 on admission and is now down to 1.8.  Will d/c IVF.  - hypotension has resolved, watch off IVF  - mild hyperglycemia, a1c=6.1%.  Will need to f/u PCP for further management/monitoring.  - K+ improved    - overall, doing well.  Plan home tomorrow if no issues.    DVT Prophylaxis:  heparin    Disposition: I expect the patient to be discharged home tomorrow.    CODE STATUS:   Code Status and Medical Interventions:   Ordered at: 08/14/20 0000     Code Status:    CPR     Medical Interventions (Level of Support Prior to Arrest):    Full       Electronically signed by Salvador Matthews MD, 08/15/20, 15:43.      "

## 2020-08-15 NOTE — PLAN OF CARE
Patient's VSS, although had temperature early in shift.  Tylenol & ice packs to core body helped.  Patient has had diarrhea this shift, also.  Slept well, has had good UOP.  Hoping to be discharged today.  Will continue to monitor.

## 2020-08-16 VITALS
BODY MASS INDEX: 27.8 KG/M2 | SYSTOLIC BLOOD PRESSURE: 118 MMHG | RESPIRATION RATE: 18 BRPM | OXYGEN SATURATION: 95 % | HEIGHT: 75 IN | HEART RATE: 70 BPM | TEMPERATURE: 98.3 F | WEIGHT: 223.6 LBS | DIASTOLIC BLOOD PRESSURE: 77 MMHG

## 2020-08-16 PROBLEM — B96.20 E COLI BACTEREMIA: Status: ACTIVE | Noted: 2020-08-14

## 2020-08-16 LAB
BACTERIA SPEC AEROBE CULT: ABNORMAL
DEPRECATED RDW RBC AUTO: 42.8 FL (ref 37–54)
ERYTHROCYTE [DISTWIDTH] IN BLOOD BY AUTOMATED COUNT: 13.8 % (ref 12.3–15.4)
GRAM STN SPEC: ABNORMAL
HCT VFR BLD AUTO: 39.1 % (ref 37.5–51)
HGB BLD-MCNC: 12.3 G/DL (ref 13–17.7)
ISOLATED FROM: ABNORMAL
ISOLATED FROM: ABNORMAL
MCH RBC QN AUTO: 26.6 PG (ref 26.6–33)
MCHC RBC AUTO-ENTMCNC: 31.5 G/DL (ref 31.5–35.7)
MCV RBC AUTO: 84.6 FL (ref 79–97)
PLATELET # BLD AUTO: 173 10*3/MM3 (ref 140–450)
PMV BLD AUTO: 10.2 FL (ref 6–12)
RBC # BLD AUTO: 4.62 10*6/MM3 (ref 4.14–5.8)
WBC # BLD AUTO: 8.63 10*3/MM3 (ref 3.4–10.8)

## 2020-08-16 PROCEDURE — 85027 COMPLETE CBC AUTOMATED: CPT | Performed by: INTERNAL MEDICINE

## 2020-08-16 PROCEDURE — 25010000002 ERTAPENEM PER 500 MG: Performed by: INTERNAL MEDICINE

## 2020-08-16 PROCEDURE — 99239 HOSP IP/OBS DSCHRG MGMT >30: CPT | Performed by: INTERNAL MEDICINE

## 2020-08-16 PROCEDURE — 25010000002 CEFTRIAXONE PER 250 MG: Performed by: INTERNAL MEDICINE

## 2020-08-16 RX ORDER — ACETAMINOPHEN 325 MG/1
650 TABLET ORAL EVERY 4 HOURS PRN
Start: 2020-08-16

## 2020-08-16 RX ORDER — ERTAPENEM 1 G/1
1 INJECTION, POWDER, LYOPHILIZED, FOR SOLUTION INTRAMUSCULAR; INTRAVENOUS EVERY 24 HOURS
Status: DISCONTINUED | OUTPATIENT
Start: 2020-08-16 | End: 2020-08-16

## 2020-08-16 RX ADMIN — CEFTRIAXONE SODIUM 2 G: 2 INJECTION, POWDER, FOR SOLUTION INTRAMUSCULAR; INTRAVENOUS at 08:46

## 2020-08-16 RX ADMIN — ERTAPENEM SODIUM 1 G: 1 INJECTION, POWDER, LYOPHILIZED, FOR SOLUTION INTRAMUSCULAR; INTRAVENOUS at 09:58

## 2020-08-16 RX ADMIN — SODIUM CHLORIDE, PRESERVATIVE FREE 10 ML: 5 INJECTION INTRAVENOUS at 08:46

## 2020-08-16 NOTE — PROGRESS NOTES
"INFECTIOUS DISEASE PROGRESS NOTE   Danilo Heath  1960  [id number]    Admission Date: 8/13/2020      Requesting Provider: Dr. Salvador Matthews  Evaluating Physician: Dr. Sudhir Kidd    Reason for Consultation: Sepsis    History of present illness:    Mr. Danilo Heath is a 59 y.o. male is being evaluated for sepsis.  He has a past medical history significant for bowel obstruction prostatitis.  He presented to the ED on 8/13/2020 with complaints of dysuria, frequency, urgency, myalgia and chills that developed approximately 3 days ago.  Patient also endorsed having feelings of dizziness and generally feels \"unwell\".  He denied any recent travel or any known sick contacts.  He denied any shortness of breath.  Patient states that he saw his primary care provider, Dr. Pickering, this past Tuesday and received a urine culture which has reportedly turned positive for E. coli (data deficient) as well as a COVID-19 test that was reportedly negative (data deficient).     Since arrival Patient has been afebrile with hypotension noted.  Patient is with worsening neutrophilic leukocytosis with a current WBC 12.73, elevated lactate of 2.3. Alkaline phosphatase is elevated at 130. Acute kidney injury with a current creatinine of 2.25.  UA from 8/13 showed 3+ bacteria, too numerous to count WBCs, negative nitrites and moderate leukocyte esterase.  Urine cultures are currently in process.  Chlamydia/gonorrhea are currently in progress.  8/13 blood cultures have turned positive in 1/4 bottles for gram-negative bacilli identified as E. coli by bio fire report.    Radiologically he received a CT of the abdomen pelvis with contrast which showed a slightly hypodense prostate gland and no visible abscess otherwise normal study.  8/13 chest x-ray was negative.    8/15/20:  Tmax of 103 degrees.  He was having shaking chills. Having some diarrhea.  No abdominal cramping.     8/16/20:  He has been afebrile, and is feeling better this am.  No " fever,chills. Diarrhea is better.   Past Medical History:   Diagnosis Date   • Bowel obstruction (CMS/HCC)    • Prostatitis        Past Surgical History:   Procedure Laterality Date   • ABDOMINAL SURGERY      intestinal obstruction    • APPENDECTOMY         Family History   Problem Relation Age of Onset   • Cancer Mother    • Heart disease Father    • Stroke Father    • Diabetes Father        Social History     Socioeconomic History   • Marital status:      Spouse name: Not on file   • Number of children: Not on file   • Years of education: Not on file   • Highest education level: Not on file   Tobacco Use   • Smoking status: Never Smoker   Substance and Sexual Activity   • Alcohol use: Yes     Comment: SOCIALLY- WEEKLY    • Drug use: Never       No Known Allergies      Medication:    Current Facility-Administered Medications:   •  acetaminophen (TYLENOL) tablet 650 mg, 650 mg, Oral, Q4H PRN, Asia Pettit APRN, 650 mg at 08/14/20 2346  •  ertapenem (INVanz) 1 g/100 mL 0.9% NS VTB (mbp), 1 g, Intravenous, Q24H, Salvador Matthews MD, 1 g at 08/16/20 0958  •  heparin (porcine) 5000 UNIT/ML injection 5,000 Units, 5,000 Units, Subcutaneous, Q8H, Asia Pettit APRN, 5,000 Units at 08/15/20 2127  •  sodium chloride 0.9 % flush 10 mL, 10 mL, Intravenous, PRN, Gregorio Quezada MD  •  sodium chloride 0.9 % flush 10 mL, 10 mL, Intravenous, Q12H, Asia Pettit APRN, 10 mL at 08/16/20 0846  •  sodium chloride 0.9 % flush 10 mL, 10 mL, Intravenous, PRN, Asia Pettit APRN    Antibiotics:  Anti-Infectives (From admission, onward)    Ordered     Dose/Rate Route Frequency Start Stop    08/16/20 0917  ertapenem (INVanz) 1 g/100 mL 0.9% NS VTB (mbp)     Ordering Provider:  Salvador Matthews MD    1 g  over 30 Minutes Intravenous Every 24 Hours 08/16/20 1000 08/23/20 0959    08/16/20 0924  ertapenem (INVanz) 1 g/100 mL 0.9% NS VTB (mbp)     Ordering Provider:  Salvador Matthwes MD    1 g  over 30 Minutes Intravenous  Every 24 Hours 20 0000 20 2359    20 2138  piperacillin-tazobactam (ZOSYN) 4.5 g in iso-osmotic dextrose 100 mL IVPB (premix)     Ordering Provider:  Gregorio Quezada MD    4.5 g  200 mL/hr over 30 Minutes Intravenous Once 20 2140 20 2257              Reports headache but mild, no neck stiffness or photophobia  Has no nocturia  Physical Exam:   Vital Signs  Temp (24hrs), Av.7 °F (37.1 °C), Min:98.3 °F (36.8 °C), Max:99 °F (37.2 °C)    Temp  Min: 98.3 °F (36.8 °C)  Max: 99 °F (37.2 °C)  BP  Min: 118/77  Max: 138/87  Pulse  Min: 59  Max: 77  Resp  Min: 14  Max: 18  SpO2  Min: 93 %  Max: 97 %    GENERAL: Awake and alert, in no acute distress.  Pleasant, younger than stated age  HEENT: Normocephalic, atraumatic.   EOMI. No conjunctival injection. No icterus. No ext oral lesions    HEART: RRR; No murmur,  LUNGS: Clear to auscultation bilaterally   ABDOMEN: Soft, nontender, nondistended.   EXT:  No cyanosis, clubbing or edema. No cord.  :  Without Crane catheter.  MSK: FROM without joint effusions noted arms/legs.    SKIN: Warm and dry without cutaneous eruptions on Inspection/palpation.    NEURO: Oriented to PPT. No focal deficits on motor/sensory exam at arms/legs.  PSYCHIATRIC: Normal insight and judgement. Cooperative with PE    Laboratory Data    Results from last 7 days   Lab Units 20  0912 08/15/20  0718 20  0206   WBC 10*3/mm3 8.63 13.16* 12.73*   HEMOGLOBIN g/dL 12.3* 12.4* 11.3*   HEMATOCRIT % 39.1 40.1 37.0*   PLATELETS 10*3/mm3 173 171 148     Results from last 7 days   Lab Units 08/15/20  0718   SODIUM mmol/L 138   POTASSIUM mmol/L 3.9   CHLORIDE mmol/L 107   CO2 mmol/L 19.0*   BUN mg/dL 21   CREATININE mg/dL 1.82*   GLUCOSE mg/dL 96   CALCIUM mg/dL 8.9     Results from last 7 days   Lab Units 08/15/20  0718   ALK PHOS U/L 70   BILIRUBIN mg/dL 0.5   ALT (SGPT) U/L 17   AST (SGOT) U/L 26     Results from last 7 days   Lab Units 08/15/20  0718   SED RATE mm/hr  78*     Results from last 7 days   Lab Units 08/15/20  0718   CRP mg/dL 19.20*     Results from last 7 days   Lab Units 08/14/20  0206   LACTATE mmol/L 2.3*             Estimated Creatinine Clearance: 60.9 mL/min (A) (by C-G formula based on SCr of 1.82 mg/dL (H)).      Microbiology:  Microbiology Results (last 10 days)     Procedure Component Value - Date/Time    Eosinophil Smear - Urine, Urine, Clean Catch [[id number]]  (Normal) Collected:  08/13/20 2145    Lab Status:  Final result Specimen:  Urine, Clean Catch Updated:  08/14/20 0305     Eosinophil Smear 0 % EOS/100 Cells     Narrative:       No eosinophil seen    Urine Culture - Urine, Urine, Clean Catch [[id number]]  (Abnormal) Collected:  08/13/20 2145    Lab Status:  Preliminary result Specimen:  Urine, Clean Catch Updated:  08/15/20 1056     Urine Culture >100,000 CFU/mL Escherichia coli    Blood Culture - Blood, Arm, Right [[id number]]  (Abnormal) Collected:  08/13/20 1925    Lab Status:  Final result Specimen:  Blood from Arm, Right Updated:  08/16/20 0615     Blood Culture Escherichia coli     Isolated from Aerobic and Anaerobic Bottles     Gram Stain Anaerobic Bottle Gram negative bacilli      Aerobic Bottle Gram negative bacilli    Blood Culture - Blood, Arm, Left [[id number]]  (Abnormal)  (Susceptibility) Collected:  08/13/20 1910    Lab Status:  Final result Specimen:  Blood from Arm, Left Updated:  08/16/20 0615     Blood Culture Escherichia coli     Isolated from Aerobic and Anaerobic Bottles     Gram Stain Aerobic Bottle Gram negative bacilli    Susceptibility      Escherichia coli     JOVAN     Ampicillin Resistant     Ampicillin + Sulbactam Resistant     Cefepime Susceptible     Ceftazidime Resistant     Ceftriaxone Resistant     Gentamicin Susceptible     Levofloxacin Susceptible     Piperacillin + Tazobactam Susceptible     Trimethoprim + Sulfamethoxazole Susceptible                Susceptibility Comments     Escherichia coli    Cefazolin  sensitivity will not be reported for Enterobacteriaceae in non-urine isolates. If cefazolin is preferred, please call the microbiology lab to request an E-test.  With the exception of urinary-sourced infections, aminoglycosides should not be used as monotherapy.             Blood Culture ID, PCR - Blood, Arm, Left [[id number]]  (Abnormal) Collected:  08/13/20 1910    Lab Status:  Final result Specimen:  Blood from Arm, Left Updated:  08/14/20 1018     BCID, PCR Escherichia coli. Identification by BCID PCR.                Radiology:  Imaging Results (Last 72 Hours)     Procedure Component Value Units Date/Time    CT Abdomen Pelvis With Contrast [[id number]] Collected:  08/13/20 2031     Updated:  08/13/20 2033    Narrative:       CT Abdomen Pelvis W    INDICATION:   Lower abdomen pain today. Possible prostatitis with abscess. Frequent urination    TECHNIQUE:   CT of the abdomen and pelvis with IV contrast. Coronal and sagittal reconstructions were obtained.  Radiation dose reduction techniques included automated exposure control or exposure modulation based on body size. Count of known CT and cardiac nuc med  studies performed in previous 12 months: 0.     COMPARISON:   None available.    FINDINGS:  Abdomen: Lung bases are clear. Liver, gallbladder, spleen, pancreas, adrenal glands and kidneys are normal in appearance. The aorta is normal in size. There is no adenopathy. The bowel is unremarkable.    Pelvis: Bladder is normal. Prostate gland is mild prominence. It measures 5.2 cm maximum dimension. Most of the prostate tissue is slightly hypodense. No abscess is visible.      Impression:       Prostate gland appears be slightly lower in density than usual. It is slightly prominent measuring 5.1 cm maximum dimension.    No abscess is visible.    Otherwise study is normal          Signer Name: Roman Franco MD   Signed: 8/13/2020 8:31 PM   Workstation Name: [facility]    [facility]    XR Chest 1  View [[id number]] Collected:  08/13/20 2012     Updated:  08/13/20 2020    Narrative:       CR Chest 1 Vw    INDICATION:   Sepsis with painful urination. Chills and sweating off and on for 2 days.     COMPARISON:    None available.    FINDINGS:  Single portable AP view(s) of the chest.  The heart and mediastinal contours are normal. The lungs are clear. No pneumothorax or pleural effusion.      Impression:       No acute cardiopulmonary findings.    Signer Name: WHITNEY Quezada MD   Signed: 8/13/2020 8:12 PM   Workstation Name: [facility]    [facility]        psa 20.55    Impression:   1. Sepsis presenting with UTI with pyuria, neutrophilic leukocytosis, hypotension, lymphopenia, acute kidney injury anemia and lactic acidosis  2. Bacteremia secondary to urinary source-blood cultures positive in 1/4 bottles for gram-negative bacilli identified as E. coli   3. probable Prostatitis  4. Acute urinary tract infection  5. Hypotension  6. Anemia  7. Elevated alkaline phosphatase  8. Hypokalemia/hyponatremia  9. E. Coli UTI at PCP's office  PLAN/RECOMMENDATIONS:     1. Discontinue Ceftriaxone   2. Follow chlamydia/gonorrhea testing.  3. Discharge home  4. Follow up with Dr. Hobson 8/17 at 0845-- appt made   5. Invanz 1g IV daily           Given postive blood cultures anticipate need for 7-14 d of iv abx               PADILLA Streeter  8/16/2020  10:01

## 2020-08-16 NOTE — PLAN OF CARE
Problem: Patient Care Overview  Goal: Plan of Care Review  Outcome: Outcome(s) achieved  Flowsheets (Taken 8/16/2020 0952)  Outcome Summary: demacrus has ambulated  hallway, afebrile, no c/o at this time, receiving antibitoics before being discharged, will cont to monitor

## 2020-08-16 NOTE — PLAN OF CARE
Problem: Patient Care Overview  Goal: Plan of Care Review  Outcome: Ongoing (interventions implemented as appropriate)  Flowsheets (Taken 8/16/2020 0301)  Progress: improving  Plan of Care Reviewed With: patient  Outcome Summary: Pt resting this shift. Pt remains afebrile. No complaints of pain, nausea or vomiting. Anticipate DC today 8/16/2020. Will continue to monitor.     Problem: Patient Care Overview  Goal: Individualization and Mutuality  Outcome: Ongoing (interventions implemented as appropriate)  Flowsheets (Taken 8/16/2020 0301)  Patient Specific Goals (Include Timeframe): Monitor and assess pain q2 and prn     Problem: Sepsis/Septic Shock (Adult)  Goal: Signs and Symptoms of Listed Potential Problems Will be Absent, Minimized or Managed (Sepsis/Septic Shock)  Outcome: Ongoing (interventions implemented as appropriate)  Flowsheets (Taken 8/16/2020 0301)  Problems Assessed (Sepsis): all  Problems Present (Sepsis): none     Problem: Patient Care Overview  Goal: Discharge Needs Assessment  Flowsheets (Taken 8/16/2020 0301)  Concerns to be Addressed: denies needs/concerns at this time  Readmission Within the Last 30 Days: no previous admission in last 30 days

## 2020-08-16 NOTE — DISCHARGE SUMMARY
[facility] Medicine Services  DISCHARGE SUMMARY    Patient Name: Danilo Heath  : 1960  MRN: [id number]    Date of Admission: 2020  7:04 PM  Date of Discharge:  20  Primary Care Physician: ROSS Cole MD    Consults     Date and Time Order Name Status Description    2020 0911 Inpatient Infectious Diseases Consult Completed           Hospital Course     Presenting Problem:   Sepsis (CMS/HCC) [A41.9]  Sepsis (CMS/HCC) [A41.9]  Prostatitis [N41.9]    Active Hospital Problems    Diagnosis  POA   • **Sepsis (CMS/HCC) [A41.9]  Yes     Priority: Medium   • Prostatitis [N41.9]  Yes     Priority: Medium   • E coli bacteremia [R78.81, B96.20]  Yes     Priority: Medium   • Acute UTI (urinary tract infection) [N39.0]  Yes     Priority: Medium   • Lactic acidosis [E87.2]  Yes   • Acute renal failure superimposed on stage 3 chronic kidney disease (CMS/HCC) [N17.9, N18.3]  Yes   • Hypotension [I95.9]  Yes   • Hypokalemia [E87.6]  Yes   • Hyperglycemia [R73.9]  Yes      Resolved Hospital Problems   No resolved problems to display.          Hospital Course:  Daniol Heath is a 59 y.o. male with a history of CKD presented to the emergency room with complaints of chills, dysuria, myalgias, and frequency.  Present for about 3 days.  In the emergency room he was found to be hypotensive and tachycardic.  Creatinine was noted to be 2.3 the prior baseline in 2017 about 1.6.  CT scan of his abdomen showed mildly hypodense prostate without any other abnormalities.  He was started on empiric antibiotics for UTI based on abnormal urinalysis and admitted to the hospital.  Subsequent blood and urine cultures were all positive for E. coli.  Also had a elevated PSA to 20 all consistent with prostatitis as well.  Initially placed on Rocephin however cultures came back resistant to Rocephin and will be switched to Invanz.  First dose will be today and then plan will be to follow-up in the  infectious disease office for approximately 1 week of IV antibiotics before transitioning to orals to complete course.  Of note creatinine did improve after hydration here.  Down to 1.8 on last check.  Is been several years since he is seeing nephrology and recommend he reestablish care with them.  Is also never seen a urologist, given his elevated PSA recommend this be arranged as an outpatient as well.  This was discussed with the patient.  At the day of discharge she is feeling well and anxious to get home.  Follow-up with infectious disease and primary care physician.        Day of Discharge     HPI:   Wife present.  No issues overnight.  No fever.  Feels well enough to go home.    Review of Systems  Gen- + fevers, chills  CV- No chest pain, palpitations  Resp- No cough, dyspnea  GI- No N/V/D, abd pain    Vital Signs:   Temp:  [98.3 °F (36.8 °C)-99 °F (37.2 °C)] 98.3 °F (36.8 °C)  Heart Rate:  [59-77] 77  Resp:  [14-18] 18  BP: (118-138)/(77-90) 118/77     Physical Exam:  Constitutional: No acute distress, awake, alert, sitting up in bed  HENT: NCAT, mucous membranes moist  Respiratory: Clear to auscultation bilaterally, respiratory effort normal   Cardiovascular: RRR, no murmurs, rubs, or gallops  Gastrointestinal: Positive bowel sounds, soft, nontender, nondistended  Musculoskeletal: No bilateral ankle edema  Psychiatric: Appropriate affect, cooperative  Neurologic: Oriented x 3, no focal deficits  Skin: No rashes      Pertinent  and/or Most Recent Results     Results from last 7 days   Lab Units 08/15/20  0718 08/14/20  0206 08/13/20  1919   WBC 10*3/mm3 13.16* 12.73* 7.79   HEMOGLOBIN g/dL 12.4* 11.3* 13.4   HEMATOCRIT % 40.1 37.0* 42.7   PLATELETS 10*3/mm3 171 148 166   SODIUM mmol/L 138 136 135*   POTASSIUM mmol/L 3.9 4.0 3.4*   CHLORIDE mmol/L 107 104 102   CO2 mmol/L 19.0* 22.0 18.0*   BUN mg/dL 21 26* 26*   CREATININE mg/dL 1.82* 2.25* 2.29*   GLUCOSE mg/dL 96 137* 168*   CALCIUM mg/dL 8.9 8.7 9.4          Results from last 7 days   Lab Units 08/15/20  0718 08/13/20 1919   BILIRUBIN mg/dL 0.5 0.8   ALK PHOS U/L 70 130*   ALT (SGPT) U/L 17 21   AST (SGOT) U/L 26 27           Invalid input(s): TG, LDLCALC, LDLREALC  Results from last 7 days   Lab Units 08/15/20  0718 08/14/20  0206 08/13/20 1919   HEMOGLOBIN A1C %  --  6.10*  --    PROCALCITONIN ng/mL 68.95*  --   --    LACTATE mmol/L  --  2.3* 2.8*       Brief Urine Lab Results  (Last result in the past 365 days)      Color   Clarity   Blood   Leuk Est   Nitrite   Protein   CREAT   Urine HCG        08/13/20 2145             265.0       08/13/20 2145 Yellow Cloudy Trace Moderate (2+) Negative 30 mg/dL (1+)               Microbiology Results Abnormal     Procedure Component Value - Date/Time    Blood Culture - Blood, Arm, Left [[id number]]  (Abnormal)  (Susceptibility) Collected:  08/13/20 1910    Lab Status:  Final result Specimen:  Blood from Arm, Left Updated:  08/16/20 0615     Blood Culture Escherichia coli     Isolated from Aerobic and Anaerobic Bottles     Gram Stain Aerobic Bottle Gram negative bacilli    Susceptibility      Escherichia coli     JOVAN     Ampicillin Resistant     Ampicillin + Sulbactam Resistant     Cefepime Susceptible     Ceftazidime Resistant     Ceftriaxone Resistant     Gentamicin Susceptible     Levofloxacin Susceptible     Piperacillin + Tazobactam Susceptible     Trimethoprim + Sulfamethoxazole Susceptible                Susceptibility Comments     Escherichia coli    Cefazolin sensitivity will not be reported for Enterobacteriaceae in non-urine isolates. If cefazolin is preferred, please call the microbiology lab to request an E-test.  With the exception of urinary-sourced infections, aminoglycosides should not be used as monotherapy.             Blood Culture - Blood, Arm, Right [[id number]]  (Abnormal) Collected:  08/13/20 1925    Lab Status:  Final result Specimen:  Blood from Arm, Right Updated:  08/16/20 0615     Blood Culture  Escherichia coli     Isolated from Aerobic and Anaerobic Bottles     Gram Stain Anaerobic Bottle Gram negative bacilli      Aerobic Bottle Gram negative bacilli    Urine Culture - Urine, Urine, Clean Catch [[id number]]  (Abnormal) Collected:  08/13/20 2145    Lab Status:  Preliminary result Specimen:  Urine, Clean Catch Updated:  08/15/20 1056     Urine Culture >100,000 CFU/mL Escherichia coli    Blood Culture ID, PCR - Blood, Arm, Left [[id number]]  (Abnormal) Collected:  08/13/20 1910    Lab Status:  Final result Specimen:  Blood from Arm, Left Updated:  08/14/20 1018     BCID, PCR Escherichia coli. Identification by BCID PCR.    Eosinophil Smear - Urine, Urine, Clean Catch [[id number]]  (Normal) Collected:  08/13/20 2145    Lab Status:  Final result Specimen:  Urine, Clean Catch Updated:  08/14/20 0305     Eosinophil Smear 0 % EOS/100 Cells     Narrative:       No eosinophil seen          Imaging Results (All)     Procedure Component Value Units Date/Time    CT Abdomen Pelvis With Contrast [[id number]] Collected:  08/13/20 2031     Updated:  08/13/20 2033    Narrative:       CT Abdomen Pelvis W    INDICATION:   Lower abdomen pain today. Possible prostatitis with abscess. Frequent urination    TECHNIQUE:   CT of the abdomen and pelvis with IV contrast. Coronal and sagittal reconstructions were obtained.  Radiation dose reduction techniques included automated exposure control or exposure modulation based on body size. Count of known CT and cardiac nuc med  studies performed in previous 12 months: 0.     COMPARISON:   None available.    FINDINGS:  Abdomen: Lung bases are clear. Liver, gallbladder, spleen, pancreas, adrenal glands and kidneys are normal in appearance. The aorta is normal in size. There is no adenopathy. The bowel is unremarkable.    Pelvis: Bladder is normal. Prostate gland is mild prominence. It measures 5.2 cm maximum dimension. Most of the prostate tissue is slightly hypodense. No abscess is  visible.      Impression:       Prostate gland appears be slightly lower in density than usual. It is slightly prominent measuring 5.1 cm maximum dimension.    No abscess is visible.    Otherwise study is normal          Signer Name: Roman Franco MD   Signed: 8/13/2020 8:31 PM   Workstation Name: [facility]    [facility]    XR Chest 1 View [[id number]] Collected:  08/13/20 2012     Updated:  08/13/20 2020    Narrative:       CR Chest 1 Vw    INDICATION:   Sepsis with painful urination. Chills and sweating off and on for 2 days.     COMPARISON:    None available.    FINDINGS:  Single portable AP view(s) of the chest.  The heart and mediastinal contours are normal. The lungs are clear. No pneumothorax or pleural effusion.      Impression:       No acute cardiopulmonary findings.    Signer Name: WHITNEY Quezada MD   Signed: 8/13/2020 8:12 PM   Workstation Name: [facility]    [facility]        Plan for Follow-up of Pending Labs/Results: I will follow up results   Order Current Status    CBC (No Diff) Collected (08/16/20 0912)    Chlamydia trachomatis, Neisseria gonorrhoeae, PCR - Urine, Urine, Clean Catch In process    Urine Culture - Urine, Urine, Clean Catch Preliminary result        Discharge Details        Discharge Medications      New Medications      Instructions Start Date   acetaminophen 325 MG tablet  Commonly known as:  TYLENOL   650 mg, Oral, Every 4 Hours PRN      ertapenem 1 gm/100ml solution IV  Commonly known as:  INVanz   1 g, Intravenous, Every 24 Hours             No Known Allergies      Discharge Disposition:  Home or Self Care    Diet:  Hospital:  Diet Order   Procedures   • Diet Regular       Activity:  Activity Instructions     Activity as Tolerated               CODE STATUS:    Code Status and Medical Interventions:   Ordered at: 08/14/20 0000     Code Status:    CPR     Medical Interventions (Level of Support Prior to Arrest):    Full       No  future appointments.    Additional Instructions for the Follow-ups that You Need to Schedule     Discharge Follow-up with Specified Provider: ID per jaclyner directions   As directed      To:  ID per thier directions         Discharge Follow-up with Specified Provider: PCP 1 week   As directed      To:  PCP 1 week         Discharge Follow-up with Specified Provider: Re-establish care with [facility]   As directed      To:  Re-establish care with [facility]               Electronically signed by Salvador Matthews MD, 08/16/20, 9:27 AM.      Time Spent on Discharge:  I spent 32 minutes on this discharge activity which included: face-to-face encounter with the patient, reviewing the data in the system, coordination of the care with the nursing staff as well as consultants, documentation, and entering orders.

## 2020-08-17 ENCOUNTER — READMISSION MANAGEMENT (OUTPATIENT)
Dept: CALL CENTER | Facility: HOSPITAL | Age: 60
End: 2020-08-17

## 2020-08-17 LAB
C TRACH RRNA SPEC DONR QL NAA+PROBE: NEGATIVE
N GONORRHOEA DNA SPEC QL NAA+PROBE: NEGATIVE

## 2020-08-17 NOTE — OUTREACH NOTE
Prep Survey      Responses   [facility] [place] facility patient discharged from?  [place]   Is LACE score < 7 ?  No   Eligibility  Readm Mgmt   Discharge diagnosis  sepsis   COVID-19 Test Status  Not tested   Does the patient have one of the following disease processes/diagnoses(primary or secondary)?  Sepsis   Does the patient have Home health ordered?  No   Is there a DME ordered?  No   Comments regarding appointments  see AVS   General alerts for this patient  [facility] for infusion   Prep survey completed?  Yes          Chanelle Quezada RN

## 2020-08-20 ENCOUNTER — READMISSION MANAGEMENT (OUTPATIENT)
Dept: CALL CENTER | Facility: HOSPITAL | Age: 60
End: 2020-08-20

## 2020-08-20 NOTE — OUTREACH NOTE
Sepsis Week 1 Survey      Responses   [facility] patient discharged from?  [place]   COVID-19 Test Status  Not tested   Does the patient have one of the following disease processes/diagnoses(primary or secondary)?  Sepsis   Is there a successful TCM telephone encounter documented?  No   Week 1 attempt successful?  No   Unsuccessful attempts  Attempt 1          Nate Duarte RN

## 2020-08-26 ENCOUNTER — LAB (OUTPATIENT)
Dept: LAB | Facility: HOSPITAL | Age: 60
End: 2020-08-26

## 2020-08-26 ENCOUNTER — READMISSION MANAGEMENT (OUTPATIENT)
Dept: CALL CENTER | Facility: HOSPITAL | Age: 60
End: 2020-08-26

## 2020-08-26 ENCOUNTER — TRANSCRIBE ORDERS (OUTPATIENT)
Dept: LAB | Facility: HOSPITAL | Age: 60
End: 2020-08-26

## 2020-08-26 DIAGNOSIS — N39.0 URINARY TRACT INFECTION WITHOUT HEMATURIA, SITE UNSPECIFIED: ICD-10-CM

## 2020-08-26 DIAGNOSIS — N39.0 URINARY TRACT INFECTION WITHOUT HEMATURIA, SITE UNSPECIFIED: Primary | ICD-10-CM

## 2020-08-26 DIAGNOSIS — N14.0 NEPHROPATHY, ANALGESIC: ICD-10-CM

## 2020-08-26 DIAGNOSIS — A41.51 SEPTICEMIA DUE TO E. COLI (HCC): ICD-10-CM

## 2020-08-26 LAB
ANION GAP SERPL CALCULATED.3IONS-SCNC: 8 MMOL/L (ref 5–15)
BUN SERPL-MCNC: 25 MG/DL (ref 8–23)
BUN/CREAT SERPL: 15.1 (ref 7–25)
CALCIUM SPEC-SCNC: 9.6 MG/DL (ref 8.6–10.5)
CHLORIDE SERPL-SCNC: 106 MMOL/L (ref 98–107)
CO2 SERPL-SCNC: 23 MMOL/L (ref 22–29)
CREAT SERPL-MCNC: 1.66 MG/DL (ref 0.76–1.27)
GFR SERPL CREATININE-BSD FRML MDRD: 51 ML/MIN/1.73
GLUCOSE SERPL-MCNC: 89 MG/DL (ref 65–99)
POTASSIUM SERPL-SCNC: 4.5 MMOL/L (ref 3.5–5.2)
SODIUM SERPL-SCNC: 137 MMOL/L (ref 136–145)

## 2020-08-26 PROCEDURE — 36415 COLL VENOUS BLD VENIPUNCTURE: CPT

## 2020-08-26 PROCEDURE — 80048 BASIC METABOLIC PNL TOTAL CA: CPT

## 2020-08-26 NOTE — OUTREACH NOTE
Sepsis Week 1 Survey      Responses   [facility] patient discharged from?  [place]   COVID-19 Test Status  Not tested   Does the patient have one of the following disease processes/diagnoses(primary or secondary)?  Sepsis   Is there a successful TCM telephone encounter documented?  No   Week 1 attempt successful?  Yes   Call start time  1511   Call end time  1516   Discharge diagnosis  sepsis   Meds reviewed with patient/caregiver?  Yes   Is the patient having any side effects they believe may be caused by any medication additions or changes?  No   Does the patient have all medications related to this admission filled (includes all antibiotics, inhalers, nebulizers,steroids,etc.)  Yes   Is the patient taking all medications as directed (includes completed medication regime)?  Yes   Comments regarding appointments  Daily Invanz infusions in clinic   Does the patient have a primary care provider?   Yes   Does the patient have an appointment with their PCP within 7 days of discharge?  Yes   Has the patient kept scheduled appointments due by today?  Yes   Has home health visited the patient within 72 hours of discharge?  N/A   Pulse Ox monitoring  None   Psychosocial issues?  No   Did the patient receive a copy of their discharge instructions?  Yes   Nursing interventions  Reviewed instructions with patient   What is the patient's perception of their health status since discharge?  Improving   Nursing interventions  Nurse provided patient education   Is the patient/caregiver able to teach back Sepsis?  S - Shivering,fever or very cold, E - Extreme pain or generalized discomfort (worst ever,especially abdomen), P - Pale or discolored skin, S - Sleepy, difficult to arouse,confused, I -   I feel like I might die-a feeling of hopelessness, S - Short of breath   Nursing interventions  Nurse provided reassurance to patient, Nurse provided patient education   Is patient/caregiver able to teach back steps to recovery at  home?  Set small, achievable goals for return to baseline health, Rest and regain strength   Is the patient/caregiver able to teach back signs and symptoms of worsening condition:  Fever, Rapid heart rate (>90), Altered mental status(confusion/coma), Hyperthermia, Shortness of breath/rapid respiratory rate, Edema   Is the patient/caregiver able to teach back the hierarchy of who to call/visit for symptoms/problems? PCP, Specialist, Home health nurse, Urgent Care, ED, 911  Yes   Additional teach back comments  states urination is normal, although more frequent   Week 1 call completed?  Yes          Fatuma Jaimes RN

## 2024-05-23 ENCOUNTER — TELEPHONE (OUTPATIENT)
Dept: GASTROENTEROLOGY | Facility: CLINIC | Age: 64
End: 2024-05-23

## 2024-05-23 NOTE — TELEPHONE ENCOUNTER
Hub staff attempted to follow warm transfer process and was unsuccessful     Caller: Danilo Heath    Relationship to patient: Self    Best call back number: [phone]    Patient is needing: PT IS CALLING TO SCHEDULE A COLONOSCOPY. PT SAID IS TIME FOR HIM TO HAVE ONE. LAST ONE WAS IN 2018 WITH DR. LESLIE. PLEASE GIVE PT A CALL BACK AND IF NOT ABLE TO REACH PT. IT IS OKAY TO LEAVE AN VOICEMAIL.

## 2024-07-22 ENCOUNTER — TELEPHONE (OUTPATIENT)
Dept: GASTROENTEROLOGY | Facility: CLINIC | Age: 64
End: 2024-07-22

## 2024-07-22 NOTE — TELEPHONE ENCOUNTER
Caller: ALEXANDRO REYES    Relationship: SELF    Best call back number: [phone]    Requested Prescriptions:   SUPREP    Pharmacy where request should be sent:    [facility] IN CONJUCTION WITH THE [facility]  Last office visit with prescribing clinician: Visit date not found   Last telemedicine visit with prescribing clinician: Visit date not found   Next office visit with prescribing clinician: Visit date not found     Additional details provided by patient: KWAME WAS CALLED IN TO [facility], BUT PT CAN GET SUPREP AT THE [facility] FOR FREE AND WOULD PREFER THAT    Does the patient have less than a 3 day supply:  [x] Yes  [] No    Would you like a call back once the refill request has been completed: [x] Yes [] No    If the office needs to give you a call back, can they leave a voicemail: [x] Yes [] No    Damian Schneider Rep   07/22/24 09:47 EDT

## 2024-07-30 ENCOUNTER — OUTSIDE FACILITY SERVICE (OUTPATIENT)
Dept: GASTROENTEROLOGY | Facility: CLINIC | Age: 64
End: 2024-07-30
Payer: COMMERCIAL

## 2024-07-30 PROCEDURE — 88305 TISSUE EXAM BY PATHOLOGIST: CPT | Performed by: INTERNAL MEDICINE

## 2024-07-30 PROCEDURE — 45385 COLONOSCOPY W/LESION REMOVAL: CPT | Performed by: INTERNAL MEDICINE

## 2024-07-31 ENCOUNTER — LAB REQUISITION (OUTPATIENT)
Dept: LAB | Facility: HOSPITAL | Age: 64
End: 2024-07-31
Payer: COMMERCIAL

## 2024-07-31 DIAGNOSIS — Z12.11 ENCOUNTER FOR SCREENING FOR MALIGNANT NEOPLASM OF COLON: ICD-10-CM

## 2024-08-01 LAB
CYTO UR: NORMAL
LAB AP CASE REPORT: NORMAL
LAB AP CLINICAL INFORMATION: NORMAL
PATH REPORT.FINAL DX SPEC: NORMAL
PATH REPORT.GROSS SPEC: NORMAL